# Patient Record
Sex: MALE | Race: WHITE | Employment: FULL TIME | URBAN - METROPOLITAN AREA
[De-identification: names, ages, dates, MRNs, and addresses within clinical notes are randomized per-mention and may not be internally consistent; named-entity substitution may affect disease eponyms.]

---

## 2017-02-22 PROBLEM — I10 ESSENTIAL HYPERTENSION: Status: ACTIVE | Noted: 2017-02-22

## 2017-02-22 RX ORDER — ZOLPIDEM TARTRATE 10 MG/1
TABLET ORAL
Qty: 90 TABLET | Refills: 0 | Status: SHIPPED
Start: 2017-02-22 | End: 2017-03-22

## 2017-02-22 NOTE — TELEPHONE ENCOUNTER
Future Appointments  Date Time Provider Yvonne Soni   2/24/2017 9:00 AM Meri Toney MD [facility] [place] [facility] [place]     Last Appointment: 12/02/2016, 1:30 PM, Jadiel Aviles, X-Ray  Return to clinic in 6 months  Suggested date of next visit: 03/10/20

## 2017-02-24 ENCOUNTER — OFFICE VISIT (OUTPATIENT)
Dept: FAMILY MEDICINE CLINIC | Facility: CLINIC | Age: 52
End: 2017-02-24

## 2017-02-24 VITALS
HEART RATE: 78 BPM | WEIGHT: 231 LBS | DIASTOLIC BLOOD PRESSURE: 72 MMHG | BODY MASS INDEX: 32 KG/M2 | TEMPERATURE: 97 F | SYSTOLIC BLOOD PRESSURE: 114 MMHG

## 2017-02-24 DIAGNOSIS — Z94.0 STATUS POST KIDNEY TRANSPLANT: Primary | ICD-10-CM

## 2017-02-24 DIAGNOSIS — M54.5 CHRONIC LOW BACK PAIN, UNSPECIFIED BACK PAIN LATERALITY, WITH SCIATICA PRESENCE UNSPECIFIED: ICD-10-CM

## 2017-02-24 DIAGNOSIS — G89.29 CHRONIC LOW BACK PAIN, UNSPECIFIED BACK PAIN LATERALITY, WITH SCIATICA PRESENCE UNSPECIFIED: ICD-10-CM

## 2017-02-24 DIAGNOSIS — Z94.83 PANCREAS REPLACED BY TRANSPLANT (HCC): ICD-10-CM

## 2017-02-24 PROCEDURE — 99214 OFFICE O/P EST MOD 30 MIN: CPT | Performed by: FAMILY MEDICINE

## 2017-02-24 RX ORDER — CYCLOBENZAPRINE HCL 5 MG
5 TABLET ORAL NIGHTLY PRN
Qty: 30 TABLET | Refills: 0 | Status: SHIPPED | OUTPATIENT
Start: 2017-02-24 | End: 2017-03-16

## 2017-02-24 RX ORDER — CYCLOBENZAPRINE HCL 5 MG
5 TABLET ORAL NIGHTLY PRN
Qty: 90 TABLET | Refills: 0 | Status: SHIPPED | OUTPATIENT
Start: 2017-02-24 | End: 2017-07-15

## 2017-02-24 NOTE — PROGRESS NOTES
[facility]  PROGRESS NOTE  Chief Complaint:   Patient presents with:  Referral: to dermatologist   Medication Request: muscle relaxer       HPI:   This is a 46year old male presents complaining of chronic low back pain.   Patient has had tablet Rfl: 0   Albuterol Sulfate HFA (PROAIR HFA) 108 (90 Base) MCG/ACT Inhalation Aero Soln  Disp:  Rfl:    aspirin EC 81 MG Oral Tab EC Take 81 mg by mouth daily. Disp:  Rfl:    Atorvastatin Calcium 10 MG Oral Tab Take 10 mg by mouth daily.    Disp:  R skin lesion, or excessive skin dryness. CARDIOVASCULAR:  Denies chest pain, chest pressure, chest discomfort, palpitations, edema, dyspnea on exertion or at rest.  RESPIRATORY:  Denies shortness of breath, wheezing, cough or sputum.   GASTROINTESTINAL:  Leotha Leyden ([email or web address] 75.)  -     Derm Referral - External    Other orders  -     Discontinue: Cyclobenzaprine HCl 5 MG Oral Tab; Take 1 tablet (5 mg total) by mouth nightly as needed for Muscle spasms. -     Cyclobenzaprine HCl 5 MG Oral Tab;  Take 1 tablet (5 mg total) by mo

## 2017-02-24 NOTE — PATIENT INSTRUCTIONS
Continue current medications   Advice rest, heating pad and muscle relaxant as needed   Return to clinic if no improvement.

## 2017-03-16 RX ORDER — ATENOLOL 25 MG/1
1 TABLET ORAL 2 TIMES DAILY
COMMUNITY
End: 2017-07-15

## 2017-03-16 RX ORDER — PREDNISOLONE ACETATE 10 MG/ML
1 SUSPENSION/ DROPS OPHTHALMIC 4 TIMES DAILY
Refills: 1 | COMMUNITY
Start: 2017-03-02 | End: 2017-07-15

## 2017-03-16 RX ORDER — CYCLOBENZAPRINE HCL 5 MG
TABLET ORAL
Qty: 30 TABLET | Refills: 0 | Status: SHIPPED | OUTPATIENT
Start: 2017-03-16 | End: 2017-07-01

## 2017-03-16 RX ORDER — KETOROLAC TROMETHAMINE 5 MG/ML
1 SOLUTION OPHTHALMIC 4 TIMES DAILY
Refills: 1 | COMMUNITY
Start: 2017-03-04 | End: 2017-07-15

## 2017-03-16 RX ORDER — PREDNISONE 1 MG/1
1 TABLET ORAL DAILY
Refills: 1 | COMMUNITY
Start: 2017-02-22

## 2017-03-23 RX ORDER — ZOLPIDEM TARTRATE 10 MG/1
TABLET ORAL
Qty: 90 TABLET | Refills: 0 | Status: SHIPPED | OUTPATIENT
Start: 2017-03-23 | End: 2017-06-26

## 2017-03-29 ENCOUNTER — TELEPHONE (OUTPATIENT)
Dept: FAMILY MEDICINE CLINIC | Facility: CLINIC | Age: 52
End: 2017-03-29

## 2017-05-26 PROBLEM — I10 HYPERTENSION: Status: ACTIVE | Noted: 2017-05-26

## 2017-05-26 PROBLEM — N18.4 CHRONIC KIDNEY DISEASE, STAGE 4 (SEVERE) (HCC): Status: ACTIVE | Noted: 2017-05-26

## 2017-06-23 RX ORDER — CYCLOBENZAPRINE HCL 5 MG
TABLET ORAL
Qty: 90 TABLET | Refills: 0 | OUTPATIENT
Start: 2017-06-23

## 2017-06-23 NOTE — TELEPHONE ENCOUNTER
Patient set up appt with Essence due  being out next week.     Future Appointments  Date Time Provider Yvonne Soni   6/30/2017 8:30 AM [facility]

## 2017-06-23 NOTE — TELEPHONE ENCOUNTER
Future Appointments  Date Time Provider Yvonne Soni   6/30/2017 8:30 AM ALEX Asencio [facility] [place] [facility] [place]       Return in about 3 months (around 5/24/2017). Left message to contact office.

## 2017-06-26 RX ORDER — PANTOPRAZOLE SODIUM 40 MG/1
40 TABLET, DELAYED RELEASE ORAL DAILY
Qty: 90 TABLET | Refills: 0 | Status: SHIPPED | OUTPATIENT
Start: 2017-06-26 | End: 2017-09-11

## 2017-06-26 RX ORDER — ZOLPIDEM TARTRATE 10 MG/1
10 TABLET ORAL NIGHTLY PRN
Qty: 90 TABLET | Refills: 0 | Status: SHIPPED | OUTPATIENT
Start: 2017-06-26 | End: 2017-06-29

## 2017-06-26 RX ORDER — ATORVASTATIN CALCIUM 10 MG/1
10 TABLET, FILM COATED ORAL DAILY
Qty: 90 TABLET | Refills: 0 | Status: SHIPPED | OUTPATIENT
Start: 2017-06-26 | End: 2017-09-11

## 2017-06-26 NOTE — TELEPHONE ENCOUNTER
Future Appointments  Date Time Provider Yvonne Nae   6/30/2017 8:30 AM ALEX Magdaleno [facility] [place] [facility] [place]     Return in about 3 months (around 5/24/2017).

## 2017-06-27 ENCOUNTER — TELEPHONE (OUTPATIENT)
Dept: FAMILY MEDICINE CLINIC | Facility: CLINIC | Age: 52
End: 2017-06-27

## 2017-06-29 RX ORDER — ZOLPIDEM TARTRATE 10 MG/1
10 TABLET ORAL NIGHTLY PRN
Qty: 90 TABLET | Refills: 0 | Status: SHIPPED
Start: 2017-06-29 | End: 2017-07-15

## 2017-06-29 NOTE — TELEPHONE ENCOUNTER
Dr. Prudence Meyer ordered this on 6/26/17 for pt and it was faxed to pharmacy x 2, received that pharmacy not able to take this script due to unknown issues, needs new script issued. Will issue new script to be faxed over.  More than likely because fax was placed

## 2017-06-30 ENCOUNTER — TELEPHONE (OUTPATIENT)
Dept: FAMILY MEDICINE CLINIC | Facility: CLINIC | Age: 52
End: 2017-06-30

## 2017-07-01 ENCOUNTER — OFFICE VISIT (OUTPATIENT)
Dept: FAMILY MEDICINE CLINIC | Facility: CLINIC | Age: 52
End: 2017-07-01

## 2017-07-01 VITALS
WEIGHT: 235 LBS | BODY MASS INDEX: 31.83 KG/M2 | HEART RATE: 64 BPM | HEIGHT: 72 IN | DIASTOLIC BLOOD PRESSURE: 78 MMHG | SYSTOLIC BLOOD PRESSURE: 112 MMHG | RESPIRATION RATE: 16 BRPM | TEMPERATURE: 98 F

## 2017-07-01 DIAGNOSIS — Z76.0 ENCOUNTER FOR MEDICATION REFILL: Primary | ICD-10-CM

## 2017-07-01 RX ORDER — MYCOPHENOLIC ACID 360 MG/1
360 TABLET, DELAYED RELEASE ORAL
COMMUNITY
Start: 2017-06-22 | End: 2017-07-01

## 2017-07-01 RX ORDER — NIFEDIPINE 30 MG/1
TABLET, FILM COATED, EXTENDED RELEASE ORAL
Refills: 1 | COMMUNITY
Start: 2017-05-03 | End: 2017-07-01

## 2017-07-01 RX ORDER — AMOXICILLIN 250 MG
1 CAPSULE ORAL DAILY
COMMUNITY
Start: 2016-08-09 | End: 2019-06-15 | Stop reason: ALTCHOICE

## 2017-07-01 RX ORDER — NICOTINE POLACRILEX 4 MG/1
GUM, CHEWING ORAL
COMMUNITY
End: 2017-11-02

## 2017-07-01 RX ORDER — SIMVASTATIN 20 MG
20 TABLET ORAL
COMMUNITY
End: 2017-07-01

## 2017-07-01 NOTE — PROGRESS NOTES
[address] [place] [address]  PROGRESS NOTE  Niki Santiago is a 45year old male. Chief Complaint:  Patient presents with:  Medication Follow-Up    HPI:   Patient presents to office visit for refill on Ambien for insomnia.  Patient states that he mad Rfl:    Zolpidem Tartrate 10 MG Oral Tab Take 1 tablet (10 mg total) by mouth nightly as needed for Sleep. Disp: 90 tablet Rfl: 0   atorvastatin 10 MG Oral Tab Take 1 tablet (10 mg total) by mouth daily.  Disp: 90 tablet Rfl: 0   Pantoprazole Sodium (PROTON by mouth daily. Disp:  Rfl:    Sulfamethoxazole-TMP DS (BACTRIM DS) 800-160 MG Oral Tab per tablet Take 1 tablet by mouth once. Disp:  Rfl:    tacrolimus 1 MG Oral Cap Take 1 mg by mouth.  Takes three tablets in the morning and three tablets at night  D prescription monitoring program database, patient had Ambien last refilled for 4/24/17. Refill too soon by 3 weeks. Explained to patient that I am unable to prescribe Norco.  Patient needs to follow-up with PCP in 2 weeks for refills.   Patient had no oth

## 2017-07-01 NOTE — PATIENT INSTRUCTIONS
Refill too soon for Ambien. Unable to refill Norco at this time need to follow-up with PCP in 2 weeks to address refills. Will not charge for this visit.

## 2017-07-15 ENCOUNTER — OFFICE VISIT (OUTPATIENT)
Dept: FAMILY MEDICINE CLINIC | Facility: CLINIC | Age: 52
End: 2017-07-15

## 2017-07-15 VITALS
DIASTOLIC BLOOD PRESSURE: 68 MMHG | SYSTOLIC BLOOD PRESSURE: 148 MMHG | HEIGHT: 72 IN | TEMPERATURE: 98 F | RESPIRATION RATE: 18 BRPM | HEART RATE: 72 BPM | BODY MASS INDEX: 32.44 KG/M2 | WEIGHT: 239.5 LBS

## 2017-07-15 DIAGNOSIS — M54.41 ACUTE RIGHT-SIDED LOW BACK PAIN WITH RIGHT-SIDED SCIATICA: Primary | ICD-10-CM

## 2017-07-15 DIAGNOSIS — G47.00 INSOMNIA, UNSPECIFIED TYPE: ICD-10-CM

## 2017-07-15 PROBLEM — N18.4 CHRONIC KIDNEY DISEASE, STAGE 4 (SEVERE) (HCC): Status: RESOLVED | Noted: 2017-05-26 | Resolved: 2017-07-15

## 2017-07-15 PROCEDURE — 99214 OFFICE O/P EST MOD 30 MIN: CPT | Performed by: FAMILY MEDICINE

## 2017-07-15 RX ORDER — DIAZEPAM 5 MG/1
1 TABLET ORAL AS NEEDED
Refills: 0 | COMMUNITY
Start: 2017-07-06 | End: 2017-07-15

## 2017-07-15 RX ORDER — HYDROCODONE BITARTRATE AND ACETAMINOPHEN 10; 325 MG/1; MG/1
1 TABLET ORAL EVERY 6 HOURS PRN
Qty: 30 TABLET | Refills: 0 | Status: SHIPPED | OUTPATIENT
Start: 2017-07-15 | End: 2017-11-17 | Stop reason: ALTCHOICE

## 2017-07-15 RX ORDER — CYCLOBENZAPRINE HCL 10 MG
1 TABLET ORAL AS NEEDED
Refills: 0 | COMMUNITY
Start: 2017-07-04 | End: 2017-07-15

## 2017-07-15 RX ORDER — HYDROCODONE BITARTRATE AND ACETAMINOPHEN 10; 325 MG/1; MG/1
1 TABLET ORAL AS NEEDED
Refills: 0 | COMMUNITY
Start: 2017-07-06 | End: 2017-07-15

## 2017-07-15 RX ORDER — DIAZEPAM 5 MG/1
5 TABLET ORAL EVERY 8 HOURS PRN
Qty: 30 TABLET | Refills: 0 | Status: SHIPPED | OUTPATIENT
Start: 2017-07-15 | End: 2017-12-01

## 2017-07-15 RX ORDER — ZOLPIDEM TARTRATE 10 MG/1
10 TABLET ORAL NIGHTLY PRN
Qty: 90 TABLET | Refills: 0 | Status: SHIPPED | OUTPATIENT
Start: 2017-07-15 | End: 2017-10-11

## 2017-07-15 NOTE — PATIENT INSTRUCTIONS
Continue current medications   Do not take diazepam and ambien at same time, it will make you very drowsy. Advice rest, heating pad, as needed  Keep appointment with orthopedics. Return to clinic in 1-2 weeks if no improvement.  Sooner if symptoms gets

## 2017-09-11 RX ORDER — ATORVASTATIN CALCIUM 10 MG/1
TABLET, FILM COATED ORAL
Qty: 90 TABLET | Refills: 0 | Status: SHIPPED | OUTPATIENT
Start: 2017-09-11 | End: 2017-11-01

## 2017-09-11 RX ORDER — PANTOPRAZOLE SODIUM 40 MG/1
TABLET, DELAYED RELEASE ORAL
Qty: 90 TABLET | Refills: 0 | Status: SHIPPED | OUTPATIENT
Start: 2017-09-11 | End: 2017-11-02

## 2017-09-11 NOTE — TELEPHONE ENCOUNTER
Future Appointments  Date Time Provider Yvonne Nae   11/17/2017 8:00 AM Javy Mckeon MD [facility] [place] [facility] [place]     LOV: 7/15/17

## 2017-10-11 RX ORDER — ZOLPIDEM TARTRATE 10 MG/1
10 TABLET ORAL NIGHTLY PRN
Qty: 90 TABLET | Refills: 0 | Status: SHIPPED | OUTPATIENT
Start: 2017-10-11 | End: 2018-01-13

## 2017-10-11 NOTE — TELEPHONE ENCOUNTER
Future Appointments  Date Time Provider Yvonne Soni   11/17/2017 8:00 AM Ney Urias MD [facility] [place] [facility]

## 2017-11-01 RX ORDER — ATORVASTATIN CALCIUM 10 MG/1
10 TABLET, FILM COATED ORAL NIGHTLY
Qty: 90 TABLET | Refills: 1 | Status: SHIPPED | OUTPATIENT
Start: 2017-11-01 | End: 2018-05-09

## 2017-11-01 NOTE — TELEPHONE ENCOUNTER
Future appt:     Your appointments     Date & Time Appointment Department [facility])    Nov 17, 2017  8:00 AM CST Follow up - Extended with Emily Pearson, [address] [place], [place] [address]way, Nick ([place])        [address] [place] [address]

## 2017-11-02 RX ORDER — PANTOPRAZOLE SODIUM 40 MG/1
40 TABLET, DELAYED RELEASE ORAL DAILY
Qty: 90 TABLET | Refills: 0 | Status: SHIPPED | OUTPATIENT
Start: 2017-11-02 | End: 2020-06-20 | Stop reason: ALTCHOICE

## 2017-11-02 NOTE — TELEPHONE ENCOUNTER
Future Appointments  Date Time Provider Yvonne Nae   11/17/2017 8:00 AM Luis Mensah MD [facility] [place] [facility] [place]     Patient's pharmacy switched due to insurance so they are requesting the refill.

## 2017-11-03 ENCOUNTER — TELEPHONE (OUTPATIENT)
Dept: FAMILY MEDICINE CLINIC | Facility: CLINIC | Age: 52
End: 2017-11-03

## 2017-11-03 NOTE — TELEPHONE ENCOUNTER
Received fax in regards to patient's metoprolol, this will no longer be filled by [facility] in [place] and recommending PCP prescribe this.   This is on patient's medication list.  Please call patient ask if he needs ref

## 2017-11-06 NOTE — TELEPHONE ENCOUNTER
Spoke with patient notified him of the below information. Patient states he does not need a refill of his Metoprolol at this time.

## 2017-11-17 ENCOUNTER — OFFICE VISIT (OUTPATIENT)
Dept: FAMILY MEDICINE CLINIC | Facility: CLINIC | Age: 52
End: 2017-11-17

## 2017-11-17 VITALS
RESPIRATION RATE: 16 BRPM | DIASTOLIC BLOOD PRESSURE: 58 MMHG | HEIGHT: 71.75 IN | SYSTOLIC BLOOD PRESSURE: 142 MMHG | WEIGHT: 237.19 LBS | HEART RATE: 80 BPM | TEMPERATURE: 98 F | BODY MASS INDEX: 32.48 KG/M2

## 2017-11-17 DIAGNOSIS — Z94.0 STATUS POST KIDNEY TRANSPLANT: ICD-10-CM

## 2017-11-17 DIAGNOSIS — Z94.83 PANCREAS REPLACED BY TRANSPLANT (HCC): ICD-10-CM

## 2017-11-17 DIAGNOSIS — G47.00 INSOMNIA, UNSPECIFIED TYPE: ICD-10-CM

## 2017-11-17 DIAGNOSIS — Z23 NEEDS FLU SHOT: ICD-10-CM

## 2017-11-17 DIAGNOSIS — I10 ESSENTIAL HYPERTENSION: Primary | ICD-10-CM

## 2017-11-17 PROCEDURE — 99214 OFFICE O/P EST MOD 30 MIN: CPT | Performed by: FAMILY MEDICINE

## 2017-11-17 PROCEDURE — G0008 ADMIN INFLUENZA VIRUS VAC: HCPCS | Performed by: FAMILY MEDICINE

## 2017-11-17 PROCEDURE — 90686 IIV4 VACC NO PRSV 0.5 ML IM: CPT | Performed by: FAMILY MEDICINE

## 2017-11-17 RX ORDER — METOPROLOL TARTRATE 50 MG/1
50 TABLET, FILM COATED ORAL 2 TIMES DAILY
Qty: 180 TABLET | Refills: 1 | Status: SHIPPED | OUTPATIENT
Start: 2017-11-17 | End: 2018-07-27

## 2017-11-17 NOTE — PATIENT INSTRUCTIONS
Continue current medications   Cut back on coffee. Recommend healthy diet, exercise and weight loss. See Dr Agsutín Lemus for sleep evaluation.

## 2017-11-17 NOTE — PROGRESS NOTES
[address]  PROGRESS NOTE  Chief Complaint:   Patient presents with:   Follow - Up: med check, trasplant  Imm/Inj: poss flu shot      HPI:   This is a 45year old male with hx of kidney and pancreas transplant, presents for medication ref Take 1 tablet (50 mg total) by mouth 2 (two) times daily. Disp: 180 tablet Rfl: 1   Pantoprazole Sodium 40 MG Oral Tab EC Take 1 tablet (40 mg total) by mouth daily.  Disp: 90 tablet Rfl: 0   atorvastatin 10 MG Oral Tab Take 1 tablet (10 mg total) by mouth pain, visual loss, blurred vision, double vision or yellow sclerae. HEENT:  Denies hearing loss, sneezing, congestion, runny nose or sore throat. INTEGUMENTARY:  Denies rashes, itching, skin lesion, or excessive skin dryness.   CARDIOVASCULAR:  Denies ch rales/rhonchi/wheezing. HEART:  Regular rate and rhythm, S1 and S2 are normal, no murmurs, rubs or gallops. EXTREMITIES: No edema, no cyanosis, no clubbing, FROM, 2+ dorsalis pedis pulses bilaterally.   ABDOMEN: Soft, nondistended, nontender, bowel sounds today.     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

## 2017-12-01 ENCOUNTER — OFFICE VISIT (OUTPATIENT)
Dept: FAMILY MEDICINE CLINIC | Facility: CLINIC | Age: 52
End: 2017-12-01

## 2017-12-01 VITALS
WEIGHT: 233.63 LBS | RESPIRATION RATE: 16 BRPM | HEIGHT: 71.5 IN | SYSTOLIC BLOOD PRESSURE: 120 MMHG | DIASTOLIC BLOOD PRESSURE: 58 MMHG | HEART RATE: 78 BPM | TEMPERATURE: 97 F | BODY MASS INDEX: 31.99 KG/M2

## 2017-12-01 DIAGNOSIS — E86.0 DEHYDRATION: Primary | ICD-10-CM

## 2017-12-01 PROCEDURE — 99213 OFFICE O/P EST LOW 20 MIN: CPT | Performed by: FAMILY MEDICINE

## 2017-12-04 ENCOUNTER — TELEPHONE (OUTPATIENT)
Dept: FAMILY MEDICINE CLINIC | Facility: CLINIC | Age: 52
End: 2017-12-04

## 2017-12-05 ENCOUNTER — TELEPHONE (OUTPATIENT)
Dept: FAMILY MEDICINE CLINIC | Facility: CLINIC | Age: 52
End: 2017-12-05

## 2017-12-05 NOTE — TELEPHONE ENCOUNTER
Received labs from [facility] that was ordered by Dr. Mandy Bledsoe. Called 's office per [facility] to verify that Magnesium level was being addressed. Spoke with nurse states Norma Valverde will address all labs.

## 2017-12-22 ENCOUNTER — OFFICE VISIT (OUTPATIENT)
Dept: FAMILY MEDICINE CLINIC | Facility: CLINIC | Age: 52
End: 2017-12-22

## 2017-12-22 VITALS
SYSTOLIC BLOOD PRESSURE: 142 MMHG | WEIGHT: 230 LBS | BODY MASS INDEX: 32 KG/M2 | HEART RATE: 76 BPM | TEMPERATURE: 97 F | DIASTOLIC BLOOD PRESSURE: 72 MMHG | RESPIRATION RATE: 16 BRPM

## 2017-12-22 DIAGNOSIS — G25.81 RLS (RESTLESS LEGS SYNDROME): ICD-10-CM

## 2017-12-22 DIAGNOSIS — G47.61 PLMD (PERIODIC LIMB MOVEMENT DISORDER): ICD-10-CM

## 2017-12-22 DIAGNOSIS — R06.83 SNORING: ICD-10-CM

## 2017-12-22 DIAGNOSIS — G47.9 SLEEP DISTURBANCE: Primary | ICD-10-CM

## 2017-12-22 PROCEDURE — 99214 OFFICE O/P EST MOD 30 MIN: CPT | Performed by: NURSE PRACTITIONER

## 2017-12-22 RX ORDER — MELATONIN
1000 DAILY
COMMUNITY
Start: 2017-12-13 | End: 2019-03-15

## 2017-12-22 RX ORDER — MELATONIN
500 2 TIMES DAILY
COMMUNITY

## 2017-12-22 NOTE — PROGRESS NOTES
[facility]  SLEEP PROGR[facility] NOTE        HPI:   This is a 45year old male coming in for Patient presents with:  Consult: Sleep      HPI: Present for a sleep consult. States that he only gets 4 hours of sleep a night.  Is on prednisone for Age of Onset   • Cancer Mother    • Cancer Sister    • Hypertension Brother      Allergies:    Amlodipine                  Comment:Other reaction(s): ankle swelling  Ace Inhibitors          Coughing    Comment:cough  Current Meds:    Current Outpatient Pre tacrolimus 1 MG Oral Cap Take 1 mg by mouth.  Takes three tablets in the morning and three tablets at night  Disp:  Rfl:    ARTIFICIAL TEARS 0.1-0.3 % Ophthalmic Solution None Entered Disp:  Rfl:       Counseling given: Not Answered         Problem List: normal.   Neck: Normal range of motion. Neck supple. No thyromegaly present. Cardiovascular: Normal rate and regular rhythm. Exam reveals no friction rub. No murmur heard.   Pulmonary/Chest: Effort normal and breath sounds normal. No respiratory distr equiptment on a  regular schedule. Filters and seals shall be changed every 1 month,  Hoses every 3 months,   CPAP mask and humidifier  chamber changed every 6 month  with the Durable medical equipment provider.          Meds & Refills for this Visit:    N

## 2017-12-22 NOTE — PATIENT INSTRUCTIONS
Order for sleep study will be sent to [facility]. They will call to set up an appointment in the next week. If not heard from them, please call them at [phone]. If any problems, please call us at [phone].       Warned if still with s

## 2017-12-26 RX ORDER — DEXAMETHASONE 4 MG/1
TABLET ORAL
Qty: 12 G | Refills: 0 | Status: SHIPPED | OUTPATIENT
Start: 2017-12-26 | End: 2018-04-28

## 2017-12-26 NOTE — TELEPHONE ENCOUNTER
Future appt:     Your appointments     Date & Time Appointment Department [facility])    Feb 17, 2018  8:00 AM CST Follow up with Rafael Gardner [address] [place], [place] [address]way, Nick ([facility])        [address] [facility]

## 2018-01-13 RX ORDER — ZOLPIDEM TARTRATE 10 MG/1
TABLET ORAL
Qty: 90 TABLET | Refills: 0 | Status: SHIPPED | OUTPATIENT
Start: 2018-01-13 | End: 2018-03-30

## 2018-01-13 NOTE — TELEPHONE ENCOUNTER
Future Appointments  Date Time Provider [facility] Nae   1/19/2018 2:00 PM ALEX Bowling [facility] [place] [facility] [place]   2/17/2018 8:00 AM Mela Jung MD [facility] [place] [facility] [place]

## 2018-01-19 ENCOUNTER — OFFICE VISIT (OUTPATIENT)
Dept: FAMILY MEDICINE CLINIC | Facility: CLINIC | Age: 53
End: 2018-01-19

## 2018-01-19 VITALS
HEART RATE: 88 BPM | RESPIRATION RATE: 16 BRPM | TEMPERATURE: 98 F | SYSTOLIC BLOOD PRESSURE: 138 MMHG | DIASTOLIC BLOOD PRESSURE: 70 MMHG | BODY MASS INDEX: 33 KG/M2 | WEIGHT: 236.38 LBS

## 2018-01-19 DIAGNOSIS — G47.33 OSA (OBSTRUCTIVE SLEEP APNEA): Primary | ICD-10-CM

## 2018-01-19 DIAGNOSIS — G47.34 NOCTURNAL HYPOXEMIA: ICD-10-CM

## 2018-01-19 PROCEDURE — 94660 CPAP INITIATION&MGMT: CPT | Performed by: NURSE PRACTITIONER

## 2018-01-19 NOTE — PATIENT INSTRUCTIONS
Warned if still with sleep apnea and not using CPAP has 7 fold increased risk and heart attack, stroke, abnormal heart rhythm, and death. Increased risk of driving accidents. Advised to refrain from driving when sleepy.      Order for sleep study will be

## 2018-01-19 NOTE — PROGRESS NOTES
[facility]  [facility]LE[facility]GRESS [facility]        HPI:   This is a 45year old male coming in for Patient presents with: Follow - Up: SLeep study done at [facility] [place] on 1/5/18      HPI: Present to review sleep study.  Informed of the results of NAKUL and hy Age of Onset   • Cancer Mother    • Cancer Sister    • Hypertension Brother      Allergies:    Amlodipine                  Comment:Other reaction(s): ankle swelling  Ace Inhibitors          Coughing    Comment:cough  Current Meds:    Current Outpatient Pre mg by mouth every 8 (eight) hours as needed. Disp:  Rfl:    Polyethylene Glycol 3350 Oral Powder  Disp:  Rfl:    tacrolimus 1 MG Oral Cap Take 1 mg by mouth.  Takes three tablets in the morning and three tablets at night  Disp:  Rfl:    ARTIFICIAL TEARS 0 and vitals reviewed. ASSESSMENT AND PLAN:   1. NAKUL (obstructive sleep apnea)    - SLEEP MEDICINE - EXTERNAL    2.  Nocturnal hypoxemia    - SLEEP MEDICINE - EXTERNAL    Patient Instructions   Warned if still with sleep apnea and not using CPAP has 7 fo

## 2018-01-25 RX ORDER — DIAZEPAM 5 MG/1
TABLET ORAL
Qty: 30 TABLET | Refills: 0 | Status: SHIPPED | OUTPATIENT
Start: 2018-01-25 | End: 2018-06-15

## 2018-01-25 NOTE — TELEPHONE ENCOUNTER
This is not on patient's medication list nor do I see it on patient's medication list in [facility]. It looks like it was removed from patient's chart on 7/15/2017. Please advise. Future appt:     Your appointments     Date & Time Appointment Depart

## 2018-02-02 ENCOUNTER — TELEPHONE (OUTPATIENT)
Dept: FAMILY MEDICINE CLINIC | Facility: CLINIC | Age: 53
End: 2018-02-02

## 2018-02-02 DIAGNOSIS — G47.33 OSA (OBSTRUCTIVE SLEEP APNEA): Primary | ICD-10-CM

## 2018-02-02 NOTE — TELEPHONE ENCOUNTER
Patient had cpap titration study done at [facility] on 1/29/18  Dr. Jaspal Knuz read sleep study  Per Dr. Jaspal Kunz patient to start on auto cpap 11-16  Patient notified of results and recommendations and expressed understanding.   Script and all related documents f

## 2018-02-17 ENCOUNTER — OFFICE VISIT (OUTPATIENT)
Dept: FAMILY MEDICINE CLINIC | Facility: CLINIC | Age: 53
End: 2018-02-17

## 2018-02-17 VITALS
DIASTOLIC BLOOD PRESSURE: 60 MMHG | WEIGHT: 243.13 LBS | HEIGHT: 71.5 IN | TEMPERATURE: 97 F | HEART RATE: 80 BPM | SYSTOLIC BLOOD PRESSURE: 140 MMHG | BODY MASS INDEX: 33.29 KG/M2 | RESPIRATION RATE: 16 BRPM

## 2018-02-17 DIAGNOSIS — Z94.0 STATUS POST KIDNEY TRANSPLANT: ICD-10-CM

## 2018-02-17 DIAGNOSIS — Z94.83 PANCREAS REPLACED BY TRANSPLANT (HCC): ICD-10-CM

## 2018-02-17 DIAGNOSIS — I10 ESSENTIAL HYPERTENSION: Primary | ICD-10-CM

## 2018-02-17 DIAGNOSIS — G47.00 INSOMNIA, UNSPECIFIED TYPE: ICD-10-CM

## 2018-02-17 DIAGNOSIS — E78.00 PURE HYPERCHOLESTEROLEMIA: ICD-10-CM

## 2018-02-17 PROCEDURE — 99214 OFFICE O/P EST MOD 30 MIN: CPT | Performed by: FAMILY MEDICINE

## 2018-02-17 NOTE — PATIENT INSTRUCTIONS
Continue current medications   Blood pressure stable today. Advice low salt diet and exercise. Monitor your blood pressure. Return to clinic if systolic blood pressure more than 060 or diastolic more than 181.    Recommend healthy diet, exercise and weigh

## 2018-02-17 NOTE — PROGRESS NOTES
[address]  PROGRESS NOTE  Chief Complaint:   Patient presents with: Follow - Up      HPI:   This is a 45year old male who is status post kidney and pancreas transplant presents for follow-up.   Patient has been doing well and has been Single, works at [employer] History:  Family History   Problem Relation Age of Onset   • Cancer Mother    • Cancer Sister    • Hypertension Brother      Allergies:    Amlodipine                  Comment:Other reaction(s): ankle swelling times daily before meals. Disp:  Rfl:    NIFEdipine ER Osmotic Release (NIFEDICAL XL) 30 MG Oral Tablet 24 Hr Take 30 mg by mouth daily. Disp:  Rfl:    Ondansetron HCl (ZOFRAN) 4 mg tablet Take 4 mg by mouth every 8 (eight) hours as needed.    Disp:  Rf of this encounter: 71.5\". Weight as of this encounter: 243 lb 2 oz. Vital signs reviewed. Physical Exam:  GEN:  Patient is alert, awake and oriented, well developed, well nourished, no acute distress.   EYES:  Sclera anicteric, conjunctiva normal, PE 03/16/2013    Patient/Caregiver Education: Patient/Caregiver Education: There are no barriers to learning. Medical education done. Outcome: Patient verbalizes understanding.  Patient is notified to call with any questions, complications, allergies, or wor

## 2018-02-23 ENCOUNTER — OFFICE VISIT (OUTPATIENT)
Dept: FAMILY MEDICINE CLINIC | Facility: CLINIC | Age: 53
End: 2018-02-23

## 2018-02-23 VITALS
HEIGHT: 71.5 IN | SYSTOLIC BLOOD PRESSURE: 126 MMHG | BODY MASS INDEX: 33.03 KG/M2 | RESPIRATION RATE: 14 BRPM | WEIGHT: 241.19 LBS | DIASTOLIC BLOOD PRESSURE: 62 MMHG | TEMPERATURE: 99 F | HEART RATE: 80 BPM

## 2018-02-23 DIAGNOSIS — G25.81 RLS (RESTLESS LEGS SYNDROME): ICD-10-CM

## 2018-02-23 DIAGNOSIS — G47.61 PLMD (PERIODIC LIMB MOVEMENT DISORDER): ICD-10-CM

## 2018-02-23 DIAGNOSIS — G47.33 OSA (OBSTRUCTIVE SLEEP APNEA): Primary | ICD-10-CM

## 2018-02-23 PROCEDURE — 99213 OFFICE O/P EST LOW 20 MIN: CPT | Performed by: NURSE PRACTITIONER

## 2018-02-23 NOTE — PATIENT INSTRUCTIONS
Increase the maximum pressure to 18. If patient has problems with that to call on Monday. Continue using CPAP. Recheck in 1 month, sooner if problems.     Warned if still with sleep apnea and not using CPAP has 7 fold increased risk and heart attack, str

## 2018-02-23 NOTE — PROGRESS NOTES
[facility]  [facility]        HPI:   This is a 45year old male coming in for Patient presents with:  Obstructive Sleep Apnea (NAKUL)      HPI:   Patient is present to review CPAP compliance. He started using CPAP 2 weeks ago.   IDANIA testicle removal,                left trigger finger injection, kidney and                pancreas transplant 2016  8/4/09: SUBCONJUNCTIVAL INJECTN      Comment: Performed by Clare Estrada at [address]  2016: [address] [facility],[address] [place] [address] MG Oral Tab EC Take 1 tablet (40 mg total) by mouth daily. Disp: 90 tablet Rfl: 0   atorvastatin 10 MG Oral Tab Take 1 tablet (10 mg total) by mouth nightly. Disp: 90 tablet Rfl: 1   Sennosides-Docusate Sodium 8.6-50 MG Oral Tab Take by mouth.  Disp:  Rfl: Neurological: Negative. Psychiatric/Behavioral: Negative.         EXAM:   /62 (BP Location: Right arm, Patient Position: Sitting, Cuff Size: large)   Pulse 80   Temp 98.6 °F (37 °C) (Tympanic)   Resp 14   Ht 71.5\"   Wt 241 lb 3.2 oz   BMI 33.17 Instructions   Increase the maximum pressure to 18. If patient has problems with that to call on Monday. Continue using CPAP. Recheck in 1 month, sooner if problems.     Warned if still with sleep apnea and not using CPAP has 7 fold increased risk and he

## 2018-03-30 ENCOUNTER — OFFICE VISIT (OUTPATIENT)
Dept: FAMILY MEDICINE CLINIC | Facility: CLINIC | Age: 53
End: 2018-03-30

## 2018-03-30 VITALS
TEMPERATURE: 96 F | WEIGHT: 242.13 LBS | RESPIRATION RATE: 16 BRPM | DIASTOLIC BLOOD PRESSURE: 62 MMHG | HEART RATE: 88 BPM | BODY MASS INDEX: 33.16 KG/M2 | SYSTOLIC BLOOD PRESSURE: 162 MMHG | HEIGHT: 71.5 IN

## 2018-03-30 DIAGNOSIS — N18.6 END STAGE RENAL DISEASE (HCC): ICD-10-CM

## 2018-03-30 DIAGNOSIS — G25.81 RLS (RESTLESS LEGS SYNDROME): ICD-10-CM

## 2018-03-30 DIAGNOSIS — G47.33 OSA (OBSTRUCTIVE SLEEP APNEA): Primary | ICD-10-CM

## 2018-03-30 PROCEDURE — 99214 OFFICE O/P EST MOD 30 MIN: CPT | Performed by: FAMILY MEDICINE

## 2018-03-30 RX ORDER — CALCITRIOL 0.25 UG/1
1 CAPSULE, LIQUID FILLED ORAL EVERY OTHER DAY
Refills: 3 | COMMUNITY
Start: 2018-03-26 | End: 2019-03-15

## 2018-03-30 RX ORDER — HYDROCODONE BITARTRATE AND ACETAMINOPHEN 5; 325 MG/1; MG/1
1 TABLET ORAL EVERY 4 HOURS PRN
Refills: 0 | COMMUNITY
Start: 2018-03-04

## 2018-03-30 RX ORDER — LORATADINE 10 MG/1
10 TABLET ORAL DAILY
COMMUNITY
End: 2019-10-18 | Stop reason: ALTCHOICE

## 2018-03-30 RX ORDER — ZOLPIDEM TARTRATE 12.5 MG/1
12.5 TABLET, FILM COATED, EXTENDED RELEASE ORAL NIGHTLY PRN
Qty: 30 TABLET | Refills: 6 | Status: SHIPPED | OUTPATIENT
Start: 2018-03-30 | End: 2019-01-18 | Stop reason: DRUGHIGH

## 2018-03-30 RX ORDER — ACETAMINOPHEN 325 MG/1
325 TABLET ORAL EVERY 6 HOURS PRN
COMMUNITY

## 2018-03-30 NOTE — PATIENT INSTRUCTIONS
1. Increase pressure today basal rate. 2. Saline nasal spray in the am.   3. flonase 1 spray in each nostril before bed. 4. Change to extended release ambien. 5. How to avoid insomnia  1. Wake up at the same time each day. Maintaining a regular sleep computer or smart phone, watch TV, catch up on work or listen to music while in bed. All these activities can increase alertness and make it difficult to fall asleep. 7. Do not eat or drink right before going to bed. Eating a late dinner or snacking before

## 2018-03-30 NOTE — PROGRESS NOTES
[facility]  [facility]        HPI:   This is a 52year old male coming in for Patient presents with:  Obstructive Sleep Apnea (NAKUL)  Follow - Up      HPI: Patient states overall he is doing well he has no new concerns he is tole HYPERTENSION      Past Surgical History:  No date: OTHER      Comment: Left shoulder surgery, left testicle removal,                left trigger finger injection, kidney and                pancreas transplant 2016  8/4/09: [address] FLOVENT  MCG/ACT Inhalation Aerosol INHALE 1 PUFF TWICE DAILY Disp: 12 g Rfl: 0   Magnesium Oxide 400 (240 Mg) MG Oral Tab Take 400 mg by mouth 2 (two) times daily. Disp:  Rfl:    Vitamin D3 1000 units Oral Tab Take 1,000 Units by mouth daily.  Dis kidney transplant     Organ or tissue replaced by transplant     Hypertension     NAKUL (obstructive sleep apnea)     PLMD (periodic limb movement disorder)     RLS (restless legs syndrome)      REVIEW OF SYSTEMS:   Review of Systems   Constitutional: Alycia Barreto He has no cervical adenopathy. Neurological: He is alert and oriented to person, place, and time. He has normal reflexes. Skin: Skin is warm and dry. Psychiatric: He has a normal mood and affect.  His behavior is normal. Judgment and thought content n The bright lights from modern digital clocks have been associated with poor sleep. Clocks should be turned away from you and if the light is too bright, they should be covered. 4. Eliminate naps. While napping seems like a proper way to catch up on miss after dinner -- to review the day and to make plans for the next day. The goal is to avoid doing these things while trying to fall asleep. It is also useful to make a list of, say, work-related tasks for the next day before leaving work.  That, at least, el

## 2018-03-30 NOTE — PROGRESS NOTES
[facility]  PROGRESS NOTE        HPI:   This is a 52year old male coming in for Patient presents with:  Obstructive Sleep Apnea (NAKUL)  Follow - Up    HPI      Results for orders placed or performed in visit on 12/04/17  -HEMOGLOBIN A1C loratadine 10 MG Oral Tab Take 10 mg by mouth daily. Disp:  Rfl:    acetaminophen 325 MG Oral Tab Take 325 mg by mouth every 6 (six) hours as needed for Pain.  Disp:  Rfl:    Zolpidem Tartrate ER (AMBIEN CR) 12.5 MG Oral Tab CR Take 1 tablet (12.5 mg tota Disp:  Rfl:    Polyethylene Glycol 3350 Oral Powder as needed. Disp:  Rfl:    tacrolimus 1 MG Oral Cap Take 1 mg by mouth.  Takes three tablets in the morning and three tablets at night  Disp:  Rfl:    ARTIFICIAL TEARS 0.1-0.3 % Ophthalmic Solution None [id number] [facility] [id number] [phone]        No Follow-up on file.     Meds & Refills for this Visit:  Signed Prescriptions Disp Refills    Zolpidem Tartrate ER (AMBIEN CR) 12.5 MG Oral Tab CR 30 tablet 6      Sig: Take 1 table

## 2018-04-28 RX ORDER — DEXAMETHASONE 4 MG/1
TABLET ORAL
Qty: 12 G | Refills: 0 | Status: SHIPPED | OUTPATIENT
Start: 2018-04-28 | End: 2018-08-19

## 2018-04-28 NOTE — TELEPHONE ENCOUNTER
Future appt:     Your appointments     Date & Time Appointment Department [facility])    Venkatesh 15, 2018  8:00 AM CDT Medicare Annual Well Visit with Wili Frank MD [address] [facility], [facility] ([facility])    Jul 06, 2018  2

## 2018-04-28 NOTE — TELEPHONE ENCOUNTER
Future appt:     Your appointments     Date & Time Appointment Department [facility])    Venkatesh 15, 2018  8:00 AM CDT Medicare Annual Well Visit with Zenon Garcia MD [address] [facility], [facility] ([facility])    Jul 06, 2018  2

## 2018-04-30 RX ORDER — ZOLPIDEM TARTRATE 10 MG/1
TABLET ORAL
Qty: 90 TABLET | Refills: 0 | Status: SHIPPED | OUTPATIENT
Start: 2018-04-30 | End: 2018-07-23

## 2018-05-09 RX ORDER — ATORVASTATIN CALCIUM 10 MG/1
10 TABLET, FILM COATED ORAL NIGHTLY
Qty: 90 TABLET | Refills: 1 | Status: SHIPPED | OUTPATIENT
Start: 2018-05-09 | End: 2018-10-17

## 2018-05-09 NOTE — TELEPHONE ENCOUNTER
Future Appointments  Date Time Provider [facility] [place]   6/15/2018 8:00 AM Conchita Ngo MD [facility] [place] [facility] [place]   7/6/2018 2:20 PM Verner Cones, MD [facility] [place]      Return in about 4 months (around 6/17/2018).

## 2018-06-02 NOTE — TELEPHONE ENCOUNTER
Future appt:     Your appointments     Date & Time Appointment Department [facility])    Venkatesh 15, 2018  8:00 AM CDT Medicare Annual Well Visit with Claudell Public, MD [address] [facility], [facility] ([facility])    Jul 06, 2018  2

## 2018-06-15 ENCOUNTER — OFFICE VISIT (OUTPATIENT)
Dept: FAMILY MEDICINE CLINIC | Facility: CLINIC | Age: 53
End: 2018-06-15

## 2018-06-15 VITALS
TEMPERATURE: 98 F | SYSTOLIC BLOOD PRESSURE: 144 MMHG | HEART RATE: 72 BPM | HEIGHT: 71.5 IN | WEIGHT: 243.31 LBS | DIASTOLIC BLOOD PRESSURE: 68 MMHG | BODY MASS INDEX: 33.32 KG/M2 | RESPIRATION RATE: 18 BRPM

## 2018-06-15 DIAGNOSIS — I10 ESSENTIAL HYPERTENSION: ICD-10-CM

## 2018-06-15 DIAGNOSIS — Z00.00 ENCOUNTER FOR MEDICARE ANNUAL WELLNESS EXAM: Primary | ICD-10-CM

## 2018-06-15 DIAGNOSIS — Z00.00 ENCOUNTER FOR ANNUAL HEALTH EXAMINATION: ICD-10-CM

## 2018-06-15 DIAGNOSIS — Z12.5 PROSTATE CANCER SCREENING: ICD-10-CM

## 2018-06-15 DIAGNOSIS — Z94.0 STATUS POST KIDNEY TRANSPLANT: ICD-10-CM

## 2018-06-15 DIAGNOSIS — E78.00 PURE HYPERCHOLESTEROLEMIA: ICD-10-CM

## 2018-06-15 DIAGNOSIS — Z13.31 DEPRESSION SCREENING: ICD-10-CM

## 2018-06-15 PROCEDURE — G0444 DEPRESSION SCREEN ANNUAL: HCPCS | Performed by: FAMILY MEDICINE

## 2018-06-15 PROCEDURE — G0439 PPPS, SUBSEQ VISIT: HCPCS | Performed by: FAMILY MEDICINE

## 2018-06-15 RX ORDER — DIAZEPAM 5 MG/1
TABLET ORAL
Qty: 30 TABLET | Refills: 0 | Status: SHIPPED | OUTPATIENT
Start: 2018-06-15 | End: 2018-07-28

## 2018-06-15 RX ORDER — LATANOPROST 50 UG/ML
1 SOLUTION/ DROPS OPHTHALMIC NIGHTLY
COMMUNITY
End: 2018-12-14

## 2018-06-15 NOTE — PATIENT INSTRUCTIONS
Continue current medications   Check fasting labs, next blood draw at hospital.   Advice low salt diet and exercise. Monitor your blood pressure. Return to clinic if systolic blood pressure more than 047 or diastolic more than 918. Monitor glucose level. than 100 cigarettes in their lifetime   • Anyone with a family history    Colorectal Cancer Screening Covered up to Age 76     Colonoscopy Screen   Covered every 10 years- more often if abnormal Colonoscopy,10 Years due on 03/16/2013 Update [facility] Chloe Trejo Only covered with a cut with metal- TD and TDaP Not covered by Medicare Part B) No orders found for this or any previous visit.  This may be covered with your prescription benefits, but Medicare does not cover unless Medically needed    Zoster (Not covered

## 2018-06-15 NOTE — PROGRESS NOTES
HPI:   Neelima Price is a 52year old male who presents for a Medicare Subsequent Annual Wellness visit (Pt already had Initial Annual Wellness). Patient is status post kidney and pancreas transplant. His glucose level has been stable.   His kidney document but we do not have it in [facility], and patient is instructed to get our office a copy of it for scanning into Epic. He smoked tobacco in the past but quit greater than 12 months ago.   Smoking status: Former Smoker MG Oral Tab Take 1 tablet (10 mg total) by mouth nightly.    ZOLPIDEM TARTRATE 10 MG Oral Tab TAKE 1 TABLET BY MOUTH NIGHTLY AT BEDTIME AS NEEDED FOR SLEEP   FLOVENT  MCG/ACT Inhalation Aerosol INHALE 1 PUFF BY MOUTH TWICE DAILY   calciTRIOL 0.25 MCG INFORMATION:   He  has a past medical history of ANEMIA; BACK PAIN; DIABETES; Diabetes ([address] Essential hypertension; HYPERLIPIDEMIA; Hyperlipidemia; and HYPERTENSION.     He  has a past surgical history that includes vit for macular hole (8/4/09); subconju both eyes   Ears:  Normal TM's and external ear canals, both ears   Nose: Nares normal, septum midline, mucosa normal, no drainage or sinus tenderness   Throat: Lips, mucosa, and tongue normal; teeth and gums normal   Neck: Supple, symmetrical, trachea mid issues and agrees to the plan. Reinforced healthy diet, lifestyle, and exercise. Continue current medications   Check fasting labs, next blood draw at hospital.   Advice low salt diet and exercise. Monitor your blood pressure.  Return to clinic if systoli Dilated Eye Exam 6/1/2018       Prostate Cancer Screening      PSA  Annually PSA due on 03/16/2013  Update Health Maintenance if applicable     Immunizations (Update Immunization Activity if applicable)     Influenza  Covered Annually 11/17/2017   Please g

## 2018-07-06 ENCOUNTER — OFFICE VISIT (OUTPATIENT)
Dept: FAMILY MEDICINE CLINIC | Facility: CLINIC | Age: 53
End: 2018-07-06

## 2018-07-06 VITALS
WEIGHT: 246.19 LBS | TEMPERATURE: 98 F | SYSTOLIC BLOOD PRESSURE: 142 MMHG | HEIGHT: 71.5 IN | RESPIRATION RATE: 14 BRPM | HEART RATE: 72 BPM | BODY MASS INDEX: 33.71 KG/M2 | DIASTOLIC BLOOD PRESSURE: 70 MMHG

## 2018-07-06 DIAGNOSIS — G47.33 OSA (OBSTRUCTIVE SLEEP APNEA): Primary | ICD-10-CM

## 2018-07-06 PROCEDURE — 99214 OFFICE O/P EST MOD 30 MIN: CPT | Performed by: FAMILY MEDICINE

## 2018-07-06 NOTE — PATIENT INSTRUCTIONS
Date: 7/6/2018    Your sleep study is scheduled:                                                                                           You have been scheduled for the following:    ____ PAP (Positive Airway Pressure) - This therapeutic test is used to c HMO insurance, please verify that you have an approved referral.   3. This is a NO SMOKING facility. 4. Let us know if you have severe claustrophobia or have chronic nasal problems.    5. If you have an illness (even a cold), please contact us to genethedu

## 2018-07-06 NOTE — PROGRESS NOTES
[facility]        HPI:   This is a 52year old male coming in for Patient presents with:  Sleep Problem: NAKUL follow up      HPI: Patient is here for follow-up of his obstructive sleep apnea.   He is overall doing well EFFC (%) - -   Sleep REM (%) - -   TOT Sleep TM (min) - -         Past Medical History:   Diagnosis Date   • ANEMIA    • BACK PAIN    • DIABETES     Dx at age 20   • Diabetes [facility])    • Essential hypertension    • HYPERLIPIDEMIA    • Hyperlipidemia    • HY mouth nightly. Disp: 90 tablet Rfl: 1   ZOLPIDEM TARTRATE 10 MG Oral Tab TAKE 1 TABLET BY MOUTH NIGHTLY AT BEDTIME AS NEEDED FOR SLEEP Disp: 90 tablet Rfl: 0   calciTRIOL 0.25 MCG Oral Cap Take 1 capsule by mouth every other day.    Disp:  Rfl: 3   HYDROcod Aerosol INHALE 1 PUFF BY MOUTH TWICE DAILY Disp: 12 g Rfl: 0   Dextromethorphan-Guaifenesin (ROBITUSSIN DM OR) Take by mouth as needed.  Disp:  Rfl:    hydrocortisone (ANUSOL-HC) 2.5 % Rectal Cream  Disp:  Rfl:    Ondansetron HCl (ZOFRAN) 4 mg tablet Take 4 Right Ear: External ear normal.   Left Ear: External ear normal.   Nose: Nose normal.   Mouth/Throat: Oropharynx is clear and moist.   Eyes: Conjunctivae and EOM are normal. Pupils are equal, round, and reactive to light. Neck: Normal range of motion. give us permission to do so. Follow Up Appointment:    Please schedule for your follow up appointment to review the results of your testing in one to two weeks after the testing with Dr. Wm Gee at [phone].   This is currently scheduled for:________ Follow-up and Disposition     Disposition    Return if symptoms worsen or fail to improve.                 Advised if still with sleep apnea and not using CPAP has a  7 fold increase in risk of heart attack, stroke, abnormal heart rhythm  and d

## 2018-07-13 ENCOUNTER — TELEPHONE (OUTPATIENT)
Dept: FAMILY MEDICINE CLINIC | Facility: CLINIC | Age: 53
End: 2018-07-13

## 2018-07-13 NOTE — TELEPHONE ENCOUNTER
wants to know what he is supposed to do for the sleept study - said he was suppposed to have had a call letting him know but has not heard back yet

## 2018-07-13 NOTE — TELEPHONE ENCOUNTER
Patient states he is supposed to have sleep study done at [facility] sleep lab  Was in to see Dr. Ginny Armendariz on 7/6/18 and Dr. Ginny Armendariz ordered a BiPap Titration Study  Patient states that they talked about having it done on 7/19/18 but did not discuss a time  Patien

## 2018-07-18 NOTE — TELEPHONE ENCOUNTER
Patient informed of the below. States he just received a call April as well informing him of his appt time.

## 2018-07-19 ENCOUNTER — OFFICE VISIT (OUTPATIENT)
Dept: SLEEP CENTER | Age: 53
End: 2018-07-19
Attending: FAMILY MEDICINE
Payer: MEDICARE

## 2018-07-19 DIAGNOSIS — G47.33 OSA (OBSTRUCTIVE SLEEP APNEA): ICD-10-CM

## 2018-07-20 ENCOUNTER — TELEPHONE (OUTPATIENT)
Dept: FAMILY MEDICINE CLINIC | Facility: CLINIC | Age: 53
End: 2018-07-20

## 2018-07-20 NOTE — TELEPHONE ENCOUNTER
Please inform patient that his cologuard test was negative. This indicates that there is a lower likelihood that he has colorectal cancer.

## 2018-07-23 RX ORDER — ZOLPIDEM TARTRATE 10 MG/1
TABLET ORAL
Qty: 90 TABLET | Refills: 1 | Status: SHIPPED | OUTPATIENT
Start: 2018-07-23 | End: 2018-09-19

## 2018-07-23 NOTE — TELEPHONE ENCOUNTER
Future Appointments  Date Time Provider Yvonne Nae   12/14/2018 8:00 AM Gregg Betancur MD [facility] [place] [facility] [place]      Return in 6 months (on 12/15/2018).

## 2018-07-26 ENCOUNTER — OFFICE VISIT (OUTPATIENT)
Dept: SLEEP CENTER | Age: 53
End: 2018-07-26
Attending: FAMILY MEDICINE
Payer: MEDICARE

## 2018-07-26 DIAGNOSIS — G47.33 OSA (OBSTRUCTIVE SLEEP APNEA): ICD-10-CM

## 2018-07-26 PROCEDURE — 95811 POLYSOM 6/>YRS CPAP 4/> PARM: CPT

## 2018-07-27 RX ORDER — METOPROLOL TARTRATE 50 MG/1
TABLET, FILM COATED ORAL
Qty: 180 TABLET | Refills: 0 | Status: SHIPPED | OUTPATIENT
Start: 2018-07-27 | End: 2018-10-15

## 2018-07-27 NOTE — TELEPHONE ENCOUNTER
Future Appointments  Date Time Provider Yvonne Nae   12/14/2018 8:00 AM Salma Eldridge MD [facility] [place] [facility] [place]      Return in 6 months (on 12/15/2018).

## 2018-07-30 RX ORDER — DIAZEPAM 5 MG/1
TABLET ORAL
Qty: 30 TABLET | Refills: 2 | Status: SHIPPED | OUTPATIENT
Start: 2018-07-30 | End: 2019-01-21

## 2018-07-30 NOTE — PROCEDURES
[address] [address],[facility] [address]       Accredited by the [facility] of Sleep Medicine (AASM)    PATIENT'S NAME:        Lizzie EMERSON  ATTENDING PHYSICIAN:   Josse Dawson MD  REFERRING PHYSICIAN:   MD PABLO Grimes minutes. The patient spent 2.6% of the total sleep time in N1, 70% in N2, 8.6 in N3, and 18.9% in REM. Sleep latency was 9.1 minutes. REM latency was 159.0 minutes, which is delayed. Sleep efficiency was 73.4%, which is adequate.   Sleep maintenance eff

## 2018-07-31 ENCOUNTER — TELEPHONE (OUTPATIENT)
Dept: FAMILY MEDICINE CLINIC | Facility: CLINIC | Age: 53
End: 2018-07-31

## 2018-07-31 DIAGNOSIS — G47.33 OSA TREATED WITH BIPAP: Primary | ICD-10-CM

## 2018-07-31 NOTE — TELEPHONE ENCOUNTER
----- Message from Belia Gramajo MD sent at 7/31/2018  8:49 AM CDT -----  Update flowsheet,   Change bipap pressure/ may need to change to different machine. Fax all orders to [facility]. As needed. Follow up appointment 2 weeks after using bipap.

## 2018-07-31 NOTE — TELEPHONE ENCOUNTER
Patient had Bipap Titration Study done on 7/26/18 at [facility] in [place]  Patient needs to be set up with Bipap machine through [address] into flowsheet  Left message for patient to call office.     Script and sleep study needs to be fax

## 2018-08-01 NOTE — TELEPHONE ENCOUNTER
Patient informed the below results and recommendations. Patient will contact [facility] tomorrow to see about getting set up with a new machine and then c/b to schedule a f/u appt 2-3 weeks after getting machine. Please place order.

## 2018-08-20 RX ORDER — DEXAMETHASONE 4 MG/1
TABLET ORAL
Qty: 12 G | Refills: 0 | Status: SHIPPED | OUTPATIENT
Start: 2018-08-20 | End: 2019-08-01

## 2018-08-20 NOTE — TELEPHONE ENCOUNTER
Future appt:     Your appointments     Date & Time Appointment Department [facility])    Dec 14, 2018  8:00 AM CST Follow up - Extended with Slick Urban, [address] [place] [address] Nick Plata ([place])        [facility]

## 2018-08-21 ENCOUNTER — MED REC SCAN ONLY (OUTPATIENT)
Dept: FAMILY MEDICINE CLINIC | Facility: CLINIC | Age: 53
End: 2018-08-21

## 2018-08-31 ENCOUNTER — OFFICE VISIT (OUTPATIENT)
Dept: FAMILY MEDICINE CLINIC | Facility: CLINIC | Age: 53
End: 2018-08-31
Payer: MEDICARE

## 2018-08-31 VITALS
DIASTOLIC BLOOD PRESSURE: 68 MMHG | WEIGHT: 240 LBS | HEIGHT: 72 IN | TEMPERATURE: 99 F | RESPIRATION RATE: 22 BRPM | BODY MASS INDEX: 32.51 KG/M2 | SYSTOLIC BLOOD PRESSURE: 138 MMHG | HEART RATE: 68 BPM

## 2018-08-31 DIAGNOSIS — E01.0 THYROMEGALY: ICD-10-CM

## 2018-08-31 DIAGNOSIS — J31.0 CHRONIC RHINITIS: Primary | ICD-10-CM

## 2018-08-31 DIAGNOSIS — G47.61 PLMD (PERIODIC LIMB MOVEMENT DISORDER): ICD-10-CM

## 2018-08-31 DIAGNOSIS — G47.33 OSA (OBSTRUCTIVE SLEEP APNEA): ICD-10-CM

## 2018-08-31 PROCEDURE — 99214 OFFICE O/P EST MOD 30 MIN: CPT | Performed by: FAMILY MEDICINE

## 2018-08-31 RX ORDER — NIFEDIPINE 30 MG/1
TABLET, FILM COATED, EXTENDED RELEASE ORAL 2 TIMES DAILY
Refills: 3 | COMMUNITY
Start: 2018-07-29 | End: 2021-02-18

## 2018-08-31 NOTE — PROGRESS NOTES
[facility]        HPI:   This is a 52year old male coming in for Patient presents with:  Obstructive Sleep Apnea (NAKUL): follow up      HPI:  Pt is intermittently having difficulty with burping in the am, and wasn't [facility]   Sleep Study CM (CM H2O) - 21/17   Sleep EFFC (%) - 73.4%   Sleep REM (%) - 18.9%   TOT Sleep TM (min) - 389.5         Past Medical History:   Diagnosis Date   • ANEMIA    • BACK PAIN    • DIABETES     Dx at age 20   • Diabetes BY MOUTH NIGHTLY AT BEDTIME AS NEEDED FOR SLEEP Disp: 90 tablet Rfl: 1   Aluminum & Magnesium Hydroxide 200-200 MG/5ML Oral Suspension Take 400 mg by mouth. Disp:  Rfl:    latanoprost 0.005 % Ophthalmic Solution Place 1 drop into both eyes nightly.  Disp: MG Oral Tablet 24 Hr Take 30 mg by mouth daily. Disp:  Rfl:    Ondansetron HCl (ZOFRAN) 4 mg tablet Take 4 mg by mouth every 8 (eight) hours as needed. Disp:  Rfl:    Polyethylene Glycol 3350 Oral Powder as needed.    Disp:  Rfl:    tacrolimus 1 MG Oral signs reviewed. Physical Exam   Constitutional: He is oriented to person, place, and time. He appears well-developed and well-nourished. HENT:   Head: Normocephalic and atraumatic.    Right Ear: External ear normal.   Left Ear: External ear normal.   Nos a  7 fold increase in risk of heart attack, stroke, abnormal heart rhythm  and death,  increased risk of driving accidents. Advised to refrain from driving when sleepy. COMPLIANCE is required by insurance for 4 hours a night most nights of the week.

## 2018-09-01 ENCOUNTER — LABORATORY ENCOUNTER (OUTPATIENT)
Dept: LAB | Age: 53
End: 2018-09-01
Attending: FAMILY MEDICINE
Payer: MEDICARE

## 2018-09-01 DIAGNOSIS — I10 ESSENTIAL HYPERTENSION: ICD-10-CM

## 2018-09-01 DIAGNOSIS — Z12.5 PROSTATE CANCER SCREENING: ICD-10-CM

## 2018-09-01 DIAGNOSIS — E78.00 PURE HYPERCHOLESTEROLEMIA: ICD-10-CM

## 2018-09-01 DIAGNOSIS — J31.0 CHRONIC RHINITIS: ICD-10-CM

## 2018-09-01 DIAGNOSIS — N18.6 END STAGE RENAL DISEASE (HCC): ICD-10-CM

## 2018-09-01 DIAGNOSIS — G25.81 RLS (RESTLESS LEGS SYNDROME): ICD-10-CM

## 2018-09-01 LAB
ALBUMIN SERPL-MCNC: 3.7 G/DL (ref 3.5–4.8)
ALBUMIN/GLOB SERPL: 1.2 {RATIO} (ref 1–2)
ALP LIVER SERPL-CCNC: 98 U/L (ref 45–117)
ALT SERPL-CCNC: 32 U/L (ref 17–63)
ANION GAP SERPL CALC-SCNC: 10 MMOL/L (ref 0–18)
AST SERPL-CCNC: 20 U/L (ref 15–41)
BASOPHILS # BLD AUTO: 0.03 X10(3) UL (ref 0–0.1)
BASOPHILS NFR BLD AUTO: 0.5 %
BILIRUB SERPL-MCNC: 0.6 MG/DL (ref 0.1–2)
BUN BLD-MCNC: 21 MG/DL (ref 8–20)
BUN/CREAT SERPL: 14.8 (ref 10–20)
CALCIUM BLD-MCNC: 9.1 MG/DL (ref 8.3–10.3)
CHLORIDE SERPL-SCNC: 106 MMOL/L (ref 101–111)
CHOLEST SMN-MCNC: 121 MG/DL (ref ?–200)
CO2 SERPL-SCNC: 24 MMOL/L (ref 22–32)
COMPLEXED PSA SERPL-MCNC: 0.99 NG/ML (ref 0.01–4)
CREAT BLD-MCNC: 1.42 MG/DL (ref 0.7–1.3)
DEPRECATED HBV CORE AB SER IA-ACNC: 121.1 NG/ML (ref 30–530)
EOSINOPHIL # BLD AUTO: 0 X10(3) UL (ref 0–0.3)
EOSINOPHIL NFR BLD AUTO: 0 %
ERYTHROCYTE [DISTWIDTH] IN BLOOD BY AUTOMATED COUNT: 13.3 % (ref 11.5–16)
GLOBULIN PLAS-MCNC: 3.2 G/DL (ref 2.5–4)
GLUCOSE BLD-MCNC: 130 MG/DL (ref 70–99)
HAV AB SERPL IA-ACNC: 981 PG/ML (ref 193–986)
HCT VFR BLD AUTO: 44.2 % (ref 37–53)
HDLC SERPL-MCNC: 70 MG/DL (ref 40–59)
HGB BLD-MCNC: 14.4 G/DL (ref 13–17)
IMMATURE GRANULOCYTE COUNT: 0.02 X10(3) UL (ref 0–1)
IMMATURE GRANULOCYTE RATIO %: 0.3 %
LDLC SERPL CALC-MCNC: 41 MG/DL (ref ?–100)
LYMPHOCYTES # BLD AUTO: 1.07 X10(3) UL (ref 0.9–4)
LYMPHOCYTES NFR BLD AUTO: 16.6 %
M PROTEIN MFR SERPL ELPH: 6.9 G/DL (ref 6.1–8.3)
MCH RBC QN AUTO: 30.5 PG (ref 27–33.2)
MCHC RBC AUTO-ENTMCNC: 32.6 G/DL (ref 31–37)
MCV RBC AUTO: 93.6 FL (ref 80–99)
MONOCYTES # BLD AUTO: 0.63 X10(3) UL (ref 0.1–1)
MONOCYTES NFR BLD AUTO: 9.8 %
NEUTROPHIL ABS PRELIM: 4.69 X10 (3) UL (ref 1.3–6.7)
NEUTROPHILS # BLD AUTO: 4.69 X10(3) UL (ref 1.3–6.7)
NEUTROPHILS NFR BLD AUTO: 72.8 %
NONHDLC SERPL-MCNC: 51 MG/DL (ref ?–130)
OSMOLALITY SERPL CALC.SUM OF ELEC: 295 MOSM/KG (ref 275–295)
PLATELET # BLD AUTO: 291 10(3)UL (ref 150–450)
POTASSIUM SERPL-SCNC: 4.5 MMOL/L (ref 3.6–5.1)
RBC # BLD AUTO: 4.72 X10(6)UL (ref 4.3–5.7)
RED CELL DISTRIBUTION WIDTH-SD: 45.1 FL (ref 35.1–46.3)
SODIUM SERPL-SCNC: 140 MMOL/L (ref 136–144)
TRIGL SERPL-MCNC: 50 MG/DL (ref 30–149)
TSI SER-ACNC: 0.43 MIU/ML (ref 0.35–5.5)
VIT D+METAB SERPL-MCNC: 39 NG/ML (ref 30–100)
VLDLC SERPL CALC-MCNC: 10 MG/DL (ref 0–30)
WBC # BLD AUTO: 6.4 X10(3) UL (ref 4–13)

## 2018-09-01 PROCEDURE — 80053 COMPREHEN METABOLIC PANEL: CPT

## 2018-09-01 PROCEDURE — 82607 VITAMIN B-12: CPT

## 2018-09-01 PROCEDURE — 86003 ALLG SPEC IGE CRUDE XTRC EA: CPT

## 2018-09-01 PROCEDURE — 80061 LIPID PANEL: CPT

## 2018-09-01 PROCEDURE — 84443 ASSAY THYROID STIM HORMONE: CPT

## 2018-09-01 PROCEDURE — 82306 VITAMIN D 25 HYDROXY: CPT

## 2018-09-01 PROCEDURE — 85025 COMPLETE CBC W/AUTO DIFF WBC: CPT

## 2018-09-01 PROCEDURE — 82785 ASSAY OF IGE: CPT

## 2018-09-01 PROCEDURE — 36415 COLL VENOUS BLD VENIPUNCTURE: CPT

## 2018-09-01 PROCEDURE — 82728 ASSAY OF FERRITIN: CPT

## 2018-09-03 LAB
ALLERGEN,  IGE: <0.1 KU/L
ALLERGEN,  SHRIMP IGE: <0.1 KU/L
ALLERGEN,  TREE IGE: <0.1 KU/L
ALLERGEN, A.ALTERNATA(TENUIS): <0.1 KU/L
ALLERGEN, BERMUDA GRASS IGE: <0.1 KU/L
ALLERGEN, BOX ELDER/MAPLE IGE: <0.1 KU/L
ALLERGEN, CAT DANDER IGE: <0.1 KU/L
ALLERGEN, CLAM IGE: <0.1 KU/L
ALLERGEN, CODFISH: <0.1 KU/L
ALLERGEN, CORN IGE: <0.1 KU/L
ALLERGEN, COTTONWOOD IGE: <0.1 KU/L
ALLERGEN, D. FARINAE IGE: <0.1 KU/L
ALLERGEN, D.PTERONYSSINUS IGE: <0.1 KU/L
ALLERGEN, DOG DANDER IGE: <0.1 KU/L
ALLERGEN, EGG WHITE IGE: <0.1 KU/L
ALLERGEN, GERMAN COCKROACH IGE: <0.1 KU/L
ALLERGEN, HORMODENDRUM IGE: <0.1 KU/L
ALLERGEN, MARSH ELDER IGE: <0.1 KU/L
ALLERGEN, MILK (COW) IGE: <0.1 KU/L
ALLERGEN, MILK (COW) IGE: <0.1 KU/L
ALLERGEN, MOUNTAIN CEDAR IGE: <0.1 KU/L
ALLERGEN, MOUSE EPITHE IGE: <0.1 KU/L
ALLERGEN, MUCOR RACEMOSUS IGE: <0.1 KU/L
ALLERGEN, OAK TREE IGE: <0.1 KU/L
ALLERGEN, PEANUT IGE: <0.1 KU/L
ALLERGEN, PEANUT IGE: <0.1 KU/L
ALLERGEN, PECAN TREE IGE: <0.1 KU/L
ALLERGEN, PENICILLIUM NOTATUM: <0.1 KU/L
ALLERGEN, PIGWEED IGE: <0.1 KU/L
ALLERGEN, RUSSIAN THISTLE IGE: <0.1 KU/L
ALLERGEN, SCALLOP IGE: <0.1 KU/L
ALLERGEN, SHORT RAGWEED IGE: <0.1 KU/L
ALLERGEN, SOYBEAN IGE: <0.1 KU/L
ALLERGEN, TIMOTHY GRASS IGE: <0.1 KU/L
ALLERGEN, WALNUT TREE IGE: <0.1 KU/L
ALLERGEN, WALNUT/BLACK WALNUT: <0.1 KU/L
ALLERGEN, WHEAT IGE: <0.1 KU/L
ALLERGEN, WHITE ASH IGE: <0.1 KU/L
ALLERGEN, WHITE MULBERRY IGE: <0.1 KU/L
ALLERGEN,ASPERGILLUS FUMIGATUS: <0.1 KU/L
IMMUNOGLOBULIN E: 24 KU/L
IMMUNOGLOBULIN E: 24 KU/L

## 2018-09-04 ENCOUNTER — TELEPHONE (OUTPATIENT)
Dept: FAMILY MEDICINE CLINIC | Facility: CLINIC | Age: 53
End: 2018-09-04

## 2018-09-04 DIAGNOSIS — J31.0 CHRONIC RHINITIS: Primary | ICD-10-CM

## 2018-09-04 LAB
A/G RATIO: 1.2
ALBUMIN: 3.8 G/DL
ALKALINE PHOSPHATASE: 74
ALT: 32
AMYLASE: 79
ANION GAP: 9
AST: 26
BILIRUBIN, TOTAL: 0.8 MG/DL
BUN/CREATININE RATIO: 13
BUN: 19
CALCIUM: 9.4
CHLORIDE: 105
CO2: 25.4
CREATININE: 1.46 MG/DL
GFR AFRICAN AMERICAN: 64
GFR NON-AFRICAN AMERICAN: 53
GLOBULIN: 3.3
GLUCOSE BLOOD: 96
HEMATOCRIT: 45.8 %
HEMOGLOBIN A1C: 5.9
HEMOGLOBIN: 15.4 G/DL (ref 13–17)
LIPASE: 104
MAGNESIUM: 1.7
MCH: 30.9 PG
MCHC: 33.7 G/DL
MCV: 91.8 FL
MPV: 8.4
PHOSPHORUS: 2.8
PLATELETS: 265 X10E3/UL
POTASSIUM: 4.5
POTASSIUM: 4.5
PTH, INTACT: 79.4 PG/ML
RBC: 5 /HPF
RDW-CV: 15.2
SODIUM: 139
TOTAL PROTEIN: 7.1
WBC: 6.9 /HPF

## 2018-09-04 NOTE — TELEPHONE ENCOUNTER
Patient notified of results and recommendations and expressed understanding.   Referral in chart for Dr. Cristin Thayer  Patient given copy of referral with contact number    Luciano Navarrete, 09/04/18, 5:13 PM

## 2018-09-04 NOTE — TELEPHONE ENCOUNTER
----- Message from Kimberley Frye MD sent at 9/3/2018  6:57 PM CDT -----  Please inform patient that his cbc, cmp, lipid panel, tsh and psa is ok.

## 2018-09-04 NOTE — TELEPHONE ENCOUNTER
----- Message from Shashi Acosta MD sent at 9/4/2018  8:14 AM CDT -----  Congestion is not related to allergies. Recommend patient see ENT. Would recommend Dr. Cristin Thayer in [place].

## 2018-09-05 ENCOUNTER — TELEPHONE (OUTPATIENT)
Dept: FAMILY MEDICINE CLINIC | Facility: CLINIC | Age: 53
End: 2018-09-05

## 2018-09-05 NOTE — TELEPHONE ENCOUNTER
----- Message from Rodrigo Woodard MD sent at 9/5/2018 10:47 AM CDT -----  Please inform patient that labs are ok, except low magnesium. Recommend patient to call his nephrologist. Also fax result to their office.

## 2018-09-18 ENCOUNTER — TELEPHONE (OUTPATIENT)
Dept: FAMILY MEDICINE CLINIC | Facility: CLINIC | Age: 53
End: 2018-09-18

## 2018-09-18 ENCOUNTER — HOSPITAL ENCOUNTER (OUTPATIENT)
Dept: ULTRASOUND IMAGING | Age: 53
Discharge: HOME | End: 2018-09-18
Attending: FAMILY MEDICINE
Payer: MEDICARE

## 2018-09-18 DIAGNOSIS — E01.0 THYROMEGALY: ICD-10-CM

## 2018-09-18 PROCEDURE — 76536 US EXAM OF HEAD AND NECK: CPT | Performed by: FAMILY MEDICINE

## 2018-09-18 NOTE — TELEPHONE ENCOUNTER
----- Message from Giuseppe Mcclelland MD sent at 9/18/2018  2:47 PM CDT -----  Recommend discussion with Dr Adams Prior,   However, I would recommend biopsy of largest nodule if not already done.

## 2018-09-19 ENCOUNTER — OFFICE VISIT (OUTPATIENT)
Dept: FAMILY MEDICINE CLINIC | Facility: CLINIC | Age: 53
End: 2018-09-19
Payer: MEDICARE

## 2018-09-19 VITALS
BODY MASS INDEX: 33.89 KG/M2 | RESPIRATION RATE: 18 BRPM | DIASTOLIC BLOOD PRESSURE: 62 MMHG | HEART RATE: 78 BPM | WEIGHT: 247.5 LBS | HEIGHT: 71.5 IN | TEMPERATURE: 96 F | SYSTOLIC BLOOD PRESSURE: 138 MMHG

## 2018-09-19 DIAGNOSIS — E04.2 MULTINODULAR THYROID: ICD-10-CM

## 2018-09-19 DIAGNOSIS — H26.9 CATARACT, UNSPECIFIED CATARACT TYPE, UNSPECIFIED LATERALITY: ICD-10-CM

## 2018-09-19 DIAGNOSIS — Z01.818 PREOPERATIVE EXAMINATION: Primary | ICD-10-CM

## 2018-09-19 DIAGNOSIS — J01.00 ACUTE NON-RECURRENT MAXILLARY SINUSITIS: ICD-10-CM

## 2018-09-19 DIAGNOSIS — I10 ESSENTIAL HYPERTENSION: ICD-10-CM

## 2018-09-19 PROCEDURE — 99214 OFFICE O/P EST MOD 30 MIN: CPT | Performed by: FAMILY MEDICINE

## 2018-09-19 PROCEDURE — 93000 ELECTROCARDIOGRAM COMPLETE: CPT | Performed by: FAMILY MEDICINE

## 2018-09-19 RX ORDER — CEFUROXIME AXETIL 250 MG/1
1 TABLET ORAL 2 TIMES DAILY
Refills: 0 | COMMUNITY
Start: 2018-09-14 | End: 2019-03-15 | Stop reason: ALTCHOICE

## 2018-09-19 NOTE — PROGRESS NOTES
[facility]  PRE[facility]    Chief Complaint:   Patient presents with:  Pre-Op Exam  Other: Also discuss thyroid      HPI:   Yoly De La Fuente is a 52year old male with a hx of cataracts and hypertension, who presents for a pre-operative p Onset   • Cancer Mother    • Cancer Sister    • Hypertension Brother      Allergies:    Amlodipine                  Comment:Other reaction(s): ankle swelling  Ace Inhibitors          Coughing    Comment:cough  Current Meds:    Current Outpatient Medication Oral Tab EC Take 1 tablet (40 mg total) by mouth daily. Disp: 90 tablet Rfl: 0   Sennosides-Docusate Sodium 8.6-50 MG Oral Tab Take by mouth. Disp:  Rfl:    predniSONE 5 MG Oral Tab Take 1 tablet by mouth daily.  Disp:  Rfl: 1   aspirin EC 81 MG Oral Tab EC bleeding or bruising. LYMPHATICS:  Denies enlarged nodes or history of splenectomy. PSYCHIATRIC:  Denies depression or anxiety. ENDOCRINOLOGIC:  Denies excessive sweating, cold or heat intolerance, polyuria or polydipsia.   ALLERGIES:  Denies allergic re affect appropriate, no depressed mood or anxiety      ASSESSMENT AND PLAN:   Therese Brambila was seen today for pre-op exam and other.     Diagnoses and all orders for this visit:    Preoperative examination  -     ELECTROCARDIOGRAM, COMPLETE    Cataract, unspecifi

## 2018-09-19 NOTE — PATIENT INSTRUCTIONS
After today's assessment  patient is at optimum health for surgery and relatively at low risk. There are no contraindication for procedure. Continue current medications  Finish the course of antibiotics. Return to clinic if no improvement.      See

## 2018-10-15 RX ORDER — METOPROLOL TARTRATE 50 MG/1
TABLET, FILM COATED ORAL
Qty: 180 TABLET | Refills: 0 | Status: SHIPPED | OUTPATIENT
Start: 2018-10-15 | End: 2018-11-26

## 2018-10-15 NOTE — TELEPHONE ENCOUNTER
Future Appointments   Date Time Provider [facility]   12/14/2018  8:00 AM James Langley MD [facility] [place] [facility] [place]      Return in about 3 months (around 12/19/2018).

## 2018-10-17 RX ORDER — ATORVASTATIN CALCIUM 10 MG/1
TABLET, FILM COATED ORAL
Qty: 90 TABLET | Refills: 0 | Status: SHIPPED | OUTPATIENT
Start: 2018-10-17 | End: 2018-11-26

## 2018-10-17 NOTE — TELEPHONE ENCOUNTER
Future Appointments   Date Time Provider Yvonne Nae   12/14/2018  8:00 AM Mireya Medellin MD [facility] [place] [facility] [place]      Return in about 3 months (around 12/19/2018).

## 2018-11-26 RX ORDER — ZOLPIDEM TARTRATE 10 MG/1
TABLET ORAL
Qty: 90 TABLET | Refills: 0 | Status: SHIPPED | OUTPATIENT
Start: 2018-11-26 | End: 2019-03-08

## 2018-11-26 RX ORDER — METOPROLOL TARTRATE 50 MG/1
TABLET, FILM COATED ORAL
Qty: 180 TABLET | Refills: 0 | Status: SHIPPED | OUTPATIENT
Start: 2018-11-26 | End: 2019-03-08

## 2018-11-26 RX ORDER — ATORVASTATIN CALCIUM 10 MG/1
TABLET, FILM COATED ORAL
Qty: 90 TABLET | Refills: 0 | Status: SHIPPED | OUTPATIENT
Start: 2018-11-26 | End: 2019-03-08

## 2018-11-26 NOTE — TELEPHONE ENCOUNTER
Future Appointments   Date Time Provider Yvonne Nae   12/14/2018  8:00 AM Luis Mensah MD [facility] [place] [facility] [place]         Return in about 3 months (around 12/19/2018).

## 2018-12-14 ENCOUNTER — OFFICE VISIT (OUTPATIENT)
Dept: FAMILY MEDICINE CLINIC | Facility: CLINIC | Age: 53
End: 2018-12-14
Payer: MEDICARE

## 2018-12-14 VITALS
SYSTOLIC BLOOD PRESSURE: 132 MMHG | HEART RATE: 72 BPM | TEMPERATURE: 97 F | OXYGEN SATURATION: 100 % | DIASTOLIC BLOOD PRESSURE: 62 MMHG | RESPIRATION RATE: 16 BRPM | HEIGHT: 71.5 IN | WEIGHT: 257 LBS | BODY MASS INDEX: 35.19 KG/M2

## 2018-12-14 DIAGNOSIS — Z12.83 SKIN CANCER SCREENING: ICD-10-CM

## 2018-12-14 DIAGNOSIS — I10 ESSENTIAL HYPERTENSION: Primary | ICD-10-CM

## 2018-12-14 DIAGNOSIS — Z94.0 STATUS POST KIDNEY TRANSPLANT: ICD-10-CM

## 2018-12-14 PROCEDURE — 99214 OFFICE O/P EST MOD 30 MIN: CPT | Performed by: FAMILY MEDICINE

## 2018-12-14 NOTE — PATIENT INSTRUCTIONS
Continue current medications  Recommend healthy diet, exercise and weight loss  Labs reviewed   Blood pressure stable. HgA1c stable. Recommend to see a dietician.

## 2018-12-14 NOTE — PROGRESS NOTES
[facility] [place]  PROGRESS NOTE  Chief Complaint:   Patient presents with:  Medication Follow-Up: 6 month       HPI:   This is a 52year old male with history of hypertension, status post kidney transplant presents for follow-up.   Patient is c History   Problem Relation Age of Onset   • Cancer Mother    • Cancer Sister    • Hypertension Brother      Allergies:     Toradol [Ketorolac *    RENAL INSUFFICIENCY    Comment:Renal transplant  Amlodipine                  Comment:Other reaction(s): ankle Rfl:    Vitamin D3 1000 units Oral Tab Take 1,000 Units by mouth daily. Disp:  Rfl:    Dextromethorphan-Guaifenesin (ROBITUSSIN DM OR) Take by mouth as needed. Disp:  Rfl:    Pantoprazole Sodium 40 MG Oral Tab EC Take 1 tablet (40 mg total) by mouth daily. enlarged nodes   PSYCHIATRIC:  Denies depression or anxiety. ENDOCRINOLOGIC:  Denies excessive sweating, cold or heat intolerance, polyuria or polydipsia.       EXAM:   /62 (BP Location: Left arm, Patient Position: Sitting, Cuff Size: large)   Pulse dietician.        Health Maintenance:  Pneumococcal PPSV23/PCV13 Highest Risk Adult(1 of 3 - PCV13) due on 03/16/1982  FIT Colorectal Screening due on 03/16/2013  Colonoscopy due on 07/18/2017  Influenza Vaccine(1) due on 09/01/2018    Patient/Caregiver Edu

## 2018-12-21 ENCOUNTER — TELEPHONE (OUTPATIENT)
Dept: FAMILY MEDICINE CLINIC | Facility: CLINIC | Age: 53
End: 2018-12-21

## 2018-12-21 NOTE — TELEPHONE ENCOUNTER
Dr Sadia Bello needs additional info on CT scan. Called Dr Radha Patel office @ [phone] and they said the results wont be dictated until Monday but once they are they will be faxed to our office.      Informed patient of this and he understood and had no oth

## 2018-12-21 NOTE — TELEPHONE ENCOUNTER
needs orders for MRI of Brain based on CT scan done by Dr Zeke Garcia ( sp )      please advise   -dropped off CT report

## 2019-01-18 ENCOUNTER — OFFICE VISIT (OUTPATIENT)
Dept: FAMILY MEDICINE CLINIC | Facility: CLINIC | Age: 54
End: 2019-01-18
Payer: MEDICARE

## 2019-01-18 VITALS
RESPIRATION RATE: 16 BRPM | OXYGEN SATURATION: 94 % | HEART RATE: 80 BPM | HEIGHT: 71.5 IN | DIASTOLIC BLOOD PRESSURE: 62 MMHG | WEIGHT: 254.63 LBS | TEMPERATURE: 98 F | BODY MASS INDEX: 34.87 KG/M2 | SYSTOLIC BLOOD PRESSURE: 124 MMHG

## 2019-01-18 DIAGNOSIS — G47.33 OSA (OBSTRUCTIVE SLEEP APNEA): ICD-10-CM

## 2019-01-18 DIAGNOSIS — G25.81 RLS (RESTLESS LEGS SYNDROME): Primary | ICD-10-CM

## 2019-01-18 PROCEDURE — 99214 OFFICE O/P EST MOD 30 MIN: CPT | Performed by: FAMILY MEDICINE

## 2019-01-18 NOTE — PROGRESS NOTES
[facility]        HPI:   This is a 52year old male coming in for Patient presents with:  Obstructive Sleep Apnea (NAKUL)      HPI:   Pt states he thinks hew as doing better until he got laid off ofr 3 weeks and then w H2O) - -   Sleep EFFC (%) - -   Sleep REM (%) - -   TOT Sleep TM (min) - -       No results found for: IRON, IRONTOT, TIBC, IRONSAT, TRANSFERRIN, TIBCP, IRONBIND, SAT, SATUR  Lab Results   Component Value Date    SHAR 121.1 09/01/2018     Lab Results   Comp MOUTH NIGHTLY Disp: 90 tablet Rfl: 0   ZOLPIDEM TARTRATE 10 MG Oral Tab TAKE 1 TABLET BY MOUTH NIGHTLY AT BEDTIME AS NEEDED FOR SLEEP Disp: 90 tablet Rfl: 0   METOPROLOL TARTRATE 50 MG Oral Tab TAKE 1 TABLET BY MOUTH TWICE DAILY Disp: 180 tablet Rfl: 0   C Multiple Vitamins-Minerals (MULTIVITAMIN ADULT) Oral Tab  Disp:  Rfl:    mycophenolate sodium 360 MG Oral Tab  mg 2 (two) times daily before meals.    Disp:  Rfl:    Ondansetron HCl (ZOFRAN) 4 mg tablet Take 4 mg by mouth every 8 (eight) hours as n 202 lb 15.4 oz    Vital signs reviewed. Physical Exam   Constitutional: He is oriented to person, place, and time. He appears well-developed and well-nourished. HENT:   Head: Normocephalic and atraumatic.    Right Ear: External ear normal.   Left Ear: Ex rhythm  and death,  increased risk of driving accidents. Advised to refrain from driving when sleepy. COMPLIANCE is required by insurance for 4 hours a night most nights of the week.   Recommend weight loss, and maintain and optimal BMI with Exercise 3

## 2019-01-18 NOTE — PATIENT INSTRUCTIONS
Saline nasal spray. Advised patient to change filters,masks,hoses  and tubes and equipment on a  regular schedule:     Filters should be changed every 2-4  weeks.    Seals/ cushions shall be changed every 1 month,   Hoses every 3 months,   CPAP mask, head

## 2019-01-21 RX ORDER — DIAZEPAM 5 MG/1
TABLET ORAL
Qty: 30 TABLET | Refills: 3 | OUTPATIENT
Start: 2019-01-21 | End: 2019-01-22

## 2019-01-21 NOTE — TELEPHONE ENCOUNTER
Please advise refill of Diazepam 5mg. Last Rx: 7/30/18    Future appt:     Your appointments     Date & Time Appointment Department [facility])    Apr 19, 2019  2:40 PM CDT Sleep Follow Up with Jase Chappell MD [address] [place] [address], Ryan Brady (E

## 2019-01-22 RX ORDER — DIAZEPAM 5 MG/1
TABLET ORAL
Qty: 30 TABLET | Refills: 3 | Status: SHIPPED | OUTPATIENT
Start: 2019-01-22 | End: 2019-06-15

## 2019-01-25 ENCOUNTER — HOSPITAL ENCOUNTER (OUTPATIENT)
Dept: GENERAL RADIOLOGY | Age: 54
Discharge: HOME | End: 2019-01-25
Attending: FAMILY MEDICINE
Payer: MEDICARE

## 2019-01-25 ENCOUNTER — TELEPHONE (OUTPATIENT)
Dept: FAMILY MEDICINE CLINIC | Facility: CLINIC | Age: 54
End: 2019-01-25

## 2019-01-25 ENCOUNTER — OFFICE VISIT (OUTPATIENT)
Dept: FAMILY MEDICINE CLINIC | Facility: CLINIC | Age: 54
End: 2019-01-25
Payer: MEDICARE

## 2019-01-25 VITALS
HEIGHT: 71.5 IN | DIASTOLIC BLOOD PRESSURE: 62 MMHG | TEMPERATURE: 98 F | BODY MASS INDEX: 35.19 KG/M2 | SYSTOLIC BLOOD PRESSURE: 118 MMHG | OXYGEN SATURATION: 99 % | WEIGHT: 257 LBS | HEART RATE: 81 BPM

## 2019-01-25 DIAGNOSIS — M79.605 LEFT LEG PAIN: Primary | ICD-10-CM

## 2019-01-25 DIAGNOSIS — M79.605 LEFT LEG PAIN: ICD-10-CM

## 2019-01-25 DIAGNOSIS — S89.92XA INJURY OF LEFT LOWER EXTREMITY, INITIAL ENCOUNTER: ICD-10-CM

## 2019-01-25 PROCEDURE — 99214 OFFICE O/P EST MOD 30 MIN: CPT | Performed by: FAMILY MEDICINE

## 2019-01-25 PROCEDURE — 73590 X-RAY EXAM OF LOWER LEG: CPT | Performed by: FAMILY MEDICINE

## 2019-01-25 NOTE — TELEPHONE ENCOUNTER
----- Message from Valentín Carvajal MD sent at 1/25/2019  9:08 AM CST -----  Please inform patient that radiologist report does not show any fracture. There is a soft tissue swelling in lower leg this is likely due to injury. Recommend ice pack and Tylenol as needed. Return to clinic if any concerns.

## 2019-01-25 NOTE — PROGRESS NOTES
[facility] [place]  PROGRESS NOTE  Chief Complaint:   Patient presents with:  Leg Pain      HPI:   This is a 52year old male with history of kidney transplant, hypertension presents to clinic complaining of left leg pain after he bumped his leg reaction(s): ankle swelling  Ace Inhibitors          Coughing  Current Meds:    Current Outpatient Medications:  Polyvinyl Alcohol-Povidone (REFRESH OP) Apply to eye as needed.  Disp:  Rfl:    ZOLPIDEM TARTRATE 10 MG Oral Tab TAKE 1 TABLET BY MOUTH NIGHTLY mouth 2 (two) times daily. Disp:  Rfl: 0   FLOVENT  MCG/ACT Inhalation Aerosol INHALE 1 PUFF BY MOUTH TWICE DAILY Disp: 12 g Rfl: 0   calciTRIOL 0.25 MCG Oral Cap Take 1 capsule by mouth every other day.    Disp:  Rfl: 3   HYDROcodone-acetaminophen 5 gallops. EXTREMITIES: No edema, no cyanosis, no clubbing, FROM, 2+ dorsalis pedis pulses bilaterally. SKIN: No rashes, no skin lesion, no bruising, good turgor. LYMPHATIC: No cervical lymphadenopathy, no other lymphadenopathy.   MUSCULOSKELETAL: Mild bru syndrome)     Multinodular thyroid      Renard Costa MD

## 2019-01-25 NOTE — TELEPHONE ENCOUNTER
Informed pt of his xray results. Pt will use ice packs and tylenol if no improvement pt will return to our office.

## 2019-02-05 ENCOUNTER — TELEPHONE (OUTPATIENT)
Dept: FAMILY MEDICINE CLINIC | Facility: CLINIC | Age: 54
End: 2019-02-05

## 2019-02-05 DIAGNOSIS — Z79.52 LONG TERM CURRENT USE OF SYSTEMIC STEROIDS: ICD-10-CM

## 2019-02-05 DIAGNOSIS — Z79.899 LONG-TERM USE OF HIGH-RISK MEDICATION: ICD-10-CM

## 2019-02-05 DIAGNOSIS — Z94.0 STATUS POST KIDNEY TRANSPLANT: Primary | ICD-10-CM

## 2019-02-05 NOTE — PROGRESS NOTES
Please inform patient that  transplant of his is recommending that patient has DEXA scan done and fax result to them. Order placed. Please inform patient to schedule appointment for DEXA scan as soon as possible.     Please fax result to  [facility]

## 2019-02-07 NOTE — TELEPHONE ENCOUNTER
Patient informed of the below recommendations. Phone call transferred to the front office to schedule the appt.

## 2019-02-15 ENCOUNTER — TELEPHONE (OUTPATIENT)
Dept: FAMILY MEDICINE CLINIC | Facility: CLINIC | Age: 54
End: 2019-02-15

## 2019-02-15 ENCOUNTER — HOSPITAL ENCOUNTER (OUTPATIENT)
Dept: BONE DENSITY | Age: 54
Discharge: HOME | End: 2019-02-15
Attending: FAMILY MEDICINE
Payer: MEDICARE

## 2019-02-15 DIAGNOSIS — Z23 NEED FOR VACCINATION FOR STREP PNEUMONIAE: Primary | ICD-10-CM

## 2019-02-15 DIAGNOSIS — Z79.899 LONG-TERM USE OF HIGH-RISK MEDICATION: ICD-10-CM

## 2019-02-15 DIAGNOSIS — Z79.52 LONG TERM CURRENT USE OF SYSTEMIC STEROIDS: ICD-10-CM

## 2019-02-15 DIAGNOSIS — Z94.0 STATUS POST KIDNEY TRANSPLANT: ICD-10-CM

## 2019-02-15 PROCEDURE — 77080 DXA BONE DENSITY AXIAL: CPT | Performed by: FAMILY MEDICINE

## 2019-02-15 NOTE — TELEPHONE ENCOUNTER
Patient notified and expressed understanding    Patient states he is looking for an update following some tests he had done with Dr. Tere Salazar (ENT)  States Dr. Tere Salazar wants to do some \"deeper scans\" to try to find out what is blocking his nasal passage

## 2019-02-15 NOTE — TELEPHONE ENCOUNTER
----- Message from Juliana Carey MD sent at 2/15/2019 11:19 AM CST -----  Please inform patient that DEXA scan does not show any evidence of osteoporosis or osteopenia.       Please fax result to [facility] transplant office at [phone]  Attention: Farideh Collado

## 2019-02-15 NOTE — TELEPHONE ENCOUNTER
Patient notified of results and recommendations and expressed understanding.   Results faxed    Char Hilton, 02/15/19, 1:21 PM

## 2019-02-15 NOTE — TELEPHONE ENCOUNTER
Patient states he keeps getting a message on [facility] that he needs a pneumonia shot  States he is willing to get this done if he needs to  Please advise and enter orders if needed    Kris Burgess, 02/15/19, 10:46 AM

## 2019-02-16 NOTE — TELEPHONE ENCOUNTER
According to last ENT note from Dec 21, 2018. Patient was suppose to see Neurology due to headache. There was mucocele in sinus that was seen, that is likely just mucous cyst, he can discuss this with ENT. He is seeing Dr Santa Pacheco for thyroid nodule.

## 2019-02-22 NOTE — TELEPHONE ENCOUNTER
Patient came in to office to discuss telephone call. Patient is very confused on what's going on.      Patient states that when he saw ENT he did say to see a neurologist but the patient mistaken him for saying nephrologist. Patient then didn't know who

## 2019-02-22 NOTE — TELEPHONE ENCOUNTER
I recommend patient go back to ENT for further explanation. According to last ENT note he was referred to Neurology for headache.

## 2019-02-25 NOTE — TELEPHONE ENCOUNTER
Left message for patient stating that Dr Viraj Harper was going to call ENT today and find more out for the patient.

## 2019-02-26 NOTE — TELEPHONE ENCOUNTER
Spoke with ENT yesterday. Pt did not mention his chronic sinus congestion and cpap difficulties,   Discussed with ENT,  They will call patient and reassess.

## 2019-03-08 RX ORDER — ATORVASTATIN CALCIUM 10 MG/1
TABLET, FILM COATED ORAL
Qty: 90 TABLET | Refills: 0 | Status: SHIPPED | OUTPATIENT
Start: 2019-03-08 | End: 2019-07-05

## 2019-03-08 RX ORDER — METOPROLOL TARTRATE 50 MG/1
TABLET, FILM COATED ORAL
Qty: 180 TABLET | Refills: 0 | Status: SHIPPED | OUTPATIENT
Start: 2019-03-08 | End: 2019-07-05

## 2019-03-08 RX ORDER — ZOLPIDEM TARTRATE 10 MG/1
TABLET ORAL
Qty: 90 TABLET | Refills: 0 | Status: SHIPPED | OUTPATIENT
Start: 2019-03-08 | End: 2019-03-08

## 2019-03-08 RX ORDER — ZOLPIDEM TARTRATE 10 MG/1
TABLET ORAL
Qty: 90 TABLET | Refills: 0 | Status: SHIPPED | OUTPATIENT
Start: 2019-03-08 | End: 2019-06-24

## 2019-03-08 NOTE — TELEPHONE ENCOUNTER
Future Appointments   Date Time Provider Yvonne Soni   4/19/2019  2:40 PM Britta Rome MD [facility] [place] [facility] [place]   6/21/2019  8:00 AM Wili Frank MD [facility] [place] [facility] [place]     LOV: 1/25/19

## 2019-03-15 ENCOUNTER — OFFICE VISIT (OUTPATIENT)
Dept: FAMILY MEDICINE CLINIC | Facility: CLINIC | Age: 54
End: 2019-03-15
Payer: MEDICARE

## 2019-03-15 VITALS
HEIGHT: 71.5 IN | SYSTOLIC BLOOD PRESSURE: 140 MMHG | RESPIRATION RATE: 18 BRPM | OXYGEN SATURATION: 98 % | DIASTOLIC BLOOD PRESSURE: 62 MMHG | BODY MASS INDEX: 33.86 KG/M2 | TEMPERATURE: 97 F | WEIGHT: 247.25 LBS | HEART RATE: 82 BPM

## 2019-03-15 DIAGNOSIS — H66.002 ACUTE SUPPURATIVE OTITIS MEDIA OF LEFT EAR WITHOUT SPONTANEOUS RUPTURE OF TYMPANIC MEMBRANE, RECURRENCE NOT SPECIFIED: Primary | ICD-10-CM

## 2019-03-15 DIAGNOSIS — Z23 IMMUNIZATION DUE: ICD-10-CM

## 2019-03-15 PROCEDURE — 99214 OFFICE O/P EST MOD 30 MIN: CPT | Performed by: FAMILY MEDICINE

## 2019-03-15 RX ORDER — AMOXICILLIN 500 MG/1
500 CAPSULE ORAL 3 TIMES DAILY
Qty: 30 CAPSULE | Refills: 0 | Status: SHIPPED | OUTPATIENT
Start: 2019-03-15 | End: 2019-03-25

## 2019-03-15 NOTE — PROGRESS NOTES
[address]  PROGRESS NOTE  Chief Complaint:   Patient presents with:  ER F/U: Ear still has fluid/also pt said he would like pneumonia shot today      HPI:   This is a 52year old male presents to clinic for evaluation and follow-up from Comment:Other reaction(s): ankle swelling  Ace Inhibitors          Coughing  Current Meds:    Current Outpatient Medications:  DM-guaiFENesin ER  MG Oral Tablet 12 Hr Take 1 tablet by mouth every 4 to 6 hours as needed.  Disp:  Rfl:    bree daily.   Disp:  Rfl:    Glucose Blood (FREESTYLE TEST) In Vitro Strip  Disp:  Rfl:    hydrocortisone (ANUSOL-HC) 2.5 % Rectal Cream  Disp:  Rfl:    Multiple Vitamins-Minerals (MULTIVITAMIN ADULT) Oral Tab  Disp:  Rfl:    mycophenolate sodium 360 MG Oral Ta EARS: External normal.  Left tympanic membrane erythematous and bulging. THROAT:  No post-pharyngeal erythema or exudate. Mouth:  No oral lesions or ulcerations, good dentition. NECK: Supple, no CLAD, no JVD, no thyromegaly.    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

## 2019-03-15 NOTE — PATIENT INSTRUCTIONS
Continue current medications  Start amoxicillin. Use tylenol as needed   Use flonase and claritin daily. Return to clinic in 1-2 weeks if no improvement. Sooner if symptoms gets worse.      Pneumonia vaccine in 7-10 days

## 2019-03-21 NOTE — PROGRESS NOTES
Call placed to Dr. Luciana Pires office, spoke with Keron Celestin,  via perfect [employer]. Pt requesting suppository. Awaiting orders. [facility] [place]  PROGRESS NOTE  Chief Complaint:   Patient presents with:  Hospital F/U: Severe muscle spasms & back pain      HPI:   This is a 45year old male presents complaining of low back pain and muscle spasm.   Patient was seen in [address] 7/15/2014  Smokeless tobacco: Never Used                      Alcohol use:  Yes              Comment: 2 beers a month    Social History Narrative    Single, works at [employer]      Family History:  Family History   Problem Relation Age of Onset   • C 360 MG Oral Tab  mg 2 (two) times daily before meals. Disp:  Rfl:    NIFEdipine ER Osmotic Release (NIFEDICAL XL) 30 MG Oral Tablet 24 Hr Take 30 mg by mouth daily.    Disp:  Rfl:    Ondansetron HCl (ZOFRAN) 4 mg tablet Take 4 mg by mouth every 8 (e Exam:  GEN:  Patient is alert, awake and oriented, well developed, well nourished, no acute distress. EYES:  Sclera anicteric, conjunctiva normal, PERRLA, EOMI. LUNGS: Clear to auscultation bilterally, no rales/rhonchi/wheezing.   HEART:  Regular rate and 03[birth year]16/1965  Annual Physical due on 03/16/1965  FIT Colorectal Screening due on 03/16/2013  Colonoscopy,10 Years due on 03/16/2013  PSA due on 03/16/2013    Patient/Caregiver Education: Patient/Caregiver Education: There are no barriers to learning.  Medical

## 2019-03-26 ENCOUNTER — TELEPHONE (OUTPATIENT)
Dept: FAMILY MEDICINE CLINIC | Facility: CLINIC | Age: 54
End: 2019-03-26

## 2019-03-26 NOTE — TELEPHONE ENCOUNTER
Re: finished antibotic last night / this morning -  but now was dizziness  sometime strong / weak   -     wanted you to know and what to do.    Thinks is the inner ear and still plugged

## 2019-03-27 NOTE — TELEPHONE ENCOUNTER
Patient walked in this afternoon to discuss phone call. Patient states that he occasionally feels lightheaded and dizzy but is still able to work and do regular things its just sometimes causes him to stop.  Patient states that he also has a ringing in hi

## 2019-03-28 ENCOUNTER — OFFICE VISIT (OUTPATIENT)
Dept: FAMILY MEDICINE CLINIC | Facility: CLINIC | Age: 54
End: 2019-03-28
Payer: MEDICARE

## 2019-03-28 VITALS
TEMPERATURE: 97 F | SYSTOLIC BLOOD PRESSURE: 140 MMHG | BODY MASS INDEX: 34.23 KG/M2 | HEIGHT: 71.5 IN | HEART RATE: 73 BPM | RESPIRATION RATE: 18 BRPM | DIASTOLIC BLOOD PRESSURE: 64 MMHG | WEIGHT: 250 LBS

## 2019-03-28 DIAGNOSIS — H60.392 OTHER INFECTIVE ACUTE OTITIS EXTERNA OF LEFT EAR: Primary | ICD-10-CM

## 2019-03-28 PROCEDURE — 99214 OFFICE O/P EST MOD 30 MIN: CPT | Performed by: FAMILY MEDICINE

## 2019-03-28 RX ORDER — NEOMYCIN SULFATE, POLYMYXIN B SULFATE, HYDROCORTISONE 3.5; 10000; 1 MG/ML; [USP'U]/ML; MG/ML
4 SOLUTION/ DROPS AURICULAR (OTIC) 3 TIMES DAILY
Qty: 1 BOTTLE | Refills: 0 | Status: SHIPPED | OUTPATIENT
Start: 2019-03-28 | End: 2019-06-07 | Stop reason: ALTCHOICE

## 2019-03-28 RX ORDER — CIPROFLOXACIN 500 MG/1
500 TABLET, FILM COATED ORAL 2 TIMES DAILY
Qty: 20 TABLET | Refills: 0 | Status: SHIPPED | OUTPATIENT
Start: 2019-03-28 | End: 2019-04-07

## 2019-03-28 NOTE — PROGRESS NOTES
[address]  PROGRESS NOTE  Chief Complaint:   Patient presents with:  Ear Problem: left ear fullness , 2 weeks.  amoxicillin  Dizziness  Lightheadedness  Ringing In Ear      HPI:   This is a 62year old male presents presents to clinic co [Ketorolac *    RENAL INSUFFICIENCY    Comment:Renal transplant  Amlodipine                  Comment:Other reaction(s): ankle swelling  Ace Inhibitors          Coughing  Current Meds:    Current Outpatient Medications:  Neomycin-Polymyxin-HC 1 % Otic Solut Sennosides-Docusate Sodium 8.6-50 MG Oral Tab Take 1 tablet by mouth daily. Disp:  Rfl:    predniSONE 5 MG Oral Tab Take 1 tablet by mouth daily. Disp:  Rfl: 1   aspirin EC 81 MG Oral Tab EC Take 81 mg by mouth daily.    Disp:  Rfl:    Glucose Blood (FR Normocephalic, atraumatic,   EYES: EOMI, PERRLA, no scleral icterus, conjunctivae clear bilaterally, no eye discharge  EARS: Right tympanic membrane normal.  Left outer ear canal erythematous, mild pus noted, tympanic membrane slightly erythematous.   Ava Bumps verbalizes understanding. Patient is notified to call with any questions, complications, allergies, or worsening or changing symptoms. Patient is to call with any side effects or complications from the treatments as a result of today.      Problem List:  P

## 2019-03-28 NOTE — PATIENT INSTRUCTIONS
Start ciprofloxacin and ear drops. Use tylenol as needed   See ENT if no improvement. Monitor blood pressure.

## 2019-04-15 ENCOUNTER — HOSPITAL ENCOUNTER (OUTPATIENT)
Dept: GENERAL RADIOLOGY | Age: 54
Discharge: HOME | End: 2019-04-15
Attending: NURSE PRACTITIONER
Payer: MEDICARE

## 2019-04-15 ENCOUNTER — OFFICE VISIT (OUTPATIENT)
Dept: FAMILY MEDICINE CLINIC | Facility: CLINIC | Age: 54
End: 2019-04-15
Payer: MEDICARE

## 2019-04-15 VITALS
WEIGHT: 254.81 LBS | HEART RATE: 86 BPM | OXYGEN SATURATION: 98 % | HEIGHT: 71.5 IN | TEMPERATURE: 98 F | SYSTOLIC BLOOD PRESSURE: 150 MMHG | DIASTOLIC BLOOD PRESSURE: 50 MMHG | BODY MASS INDEX: 34.89 KG/M2

## 2019-04-15 DIAGNOSIS — M25.561 PATELLAR PAIN, RIGHT: Primary | ICD-10-CM

## 2019-04-15 DIAGNOSIS — M25.551 RIGHT HIP PAIN: ICD-10-CM

## 2019-04-15 DIAGNOSIS — M25.561 PATELLAR PAIN, RIGHT: ICD-10-CM

## 2019-04-15 PROCEDURE — 99214 OFFICE O/P EST MOD 30 MIN: CPT | Performed by: NURSE PRACTITIONER

## 2019-04-15 PROCEDURE — 73562 X-RAY EXAM OF KNEE 3: CPT | Performed by: NURSE PRACTITIONER

## 2019-04-15 PROCEDURE — 73502 X-RAY EXAM HIP UNI 2-3 VIEWS: CPT | Performed by: NURSE PRACTITIONER

## 2019-04-15 RX ORDER — CYCLOBENZAPRINE HCL 10 MG
10 TABLET ORAL 3 TIMES DAILY
Qty: 30 TABLET | Refills: 0 | Status: SHIPPED | OUTPATIENT
Start: 2019-04-15 | End: 2019-06-07 | Stop reason: ALTCHOICE

## 2019-04-15 RX ORDER — ACETAMINOPHEN AND CODEINE PHOSPHATE 300; 30 MG/1; MG/1
1 TABLET ORAL EVERY 4 HOURS PRN
Qty: 20 TABLET | Refills: 0 | Status: SHIPPED
Start: 2019-04-15 | End: 2019-06-07 | Stop reason: ALTCHOICE

## 2019-04-15 NOTE — PATIENT INSTRUCTIONS
Do NOT drive on the pain medication. Rest, ice or heat therapy. NSAIDs (such as ibuprofen or Aleve) as directed. Consider physical therapy if symptoms increase or persist.  Return to clinic if symptoms worsen.

## 2019-04-15 NOTE — PROGRESS NOTES
HPI:    Patient ID: Nitesh Amaro is a 62year old male. HPI    Slipped on the ice this morning. Landed on his right knee. Went to chiropractor this morning. Is having the pain on the right side where he landed. Is having back and right hip.    Hx of predniSONE 5 MG Oral Tab Take 1 tablet by mouth daily. Disp:  Rfl: 1   aspirin EC 81 MG Oral Tab EC Take 81 mg by mouth daily.    Disp:  Rfl:    Glucose Blood (FREESTYLE TEST) In Vitro Strip  Disp:  Rfl:    Multiple Vitamins-Minerals (MULTIVITAMIN ADULT) Or Right hip: He exhibits tenderness. Right knee: He exhibits swelling. Tenderness found. Legs:  Nursing note and vitals reviewed. [facility] website reviewed.      Right hip - no acute findings, Pending radiology report            ASSESSME

## 2019-04-19 ENCOUNTER — OFFICE VISIT (OUTPATIENT)
Dept: FAMILY MEDICINE CLINIC | Facility: CLINIC | Age: 54
End: 2019-04-19
Payer: MEDICARE

## 2019-04-19 VITALS
DIASTOLIC BLOOD PRESSURE: 62 MMHG | OXYGEN SATURATION: 97 % | SYSTOLIC BLOOD PRESSURE: 130 MMHG | WEIGHT: 253 LBS | RESPIRATION RATE: 18 BRPM | TEMPERATURE: 97 F | HEART RATE: 64 BPM | HEIGHT: 71.5 IN | BODY MASS INDEX: 34.65 KG/M2

## 2019-04-19 DIAGNOSIS — E04.2 MULTINODULAR THYROID: ICD-10-CM

## 2019-04-19 DIAGNOSIS — B37.0 THRUSH: ICD-10-CM

## 2019-04-19 DIAGNOSIS — G47.33 OSA (OBSTRUCTIVE SLEEP APNEA): Primary | ICD-10-CM

## 2019-04-19 PROCEDURE — 87077 CULTURE AEROBIC IDENTIFY: CPT | Performed by: FAMILY MEDICINE

## 2019-04-19 PROCEDURE — 87205 SMEAR GRAM STAIN: CPT | Performed by: FAMILY MEDICINE

## 2019-04-19 PROCEDURE — 87070 CULTURE OTHR SPECIMN AEROBIC: CPT | Performed by: FAMILY MEDICINE

## 2019-04-19 PROCEDURE — 87186 SC STD MICRODIL/AGAR DIL: CPT | Performed by: FAMILY MEDICINE

## 2019-04-19 PROCEDURE — 99214 OFFICE O/P EST MOD 30 MIN: CPT | Performed by: FAMILY MEDICINE

## 2019-04-19 NOTE — PROGRESS NOTES
[facility]  [facility]        HPI:   This is a 62year old male coming in for Patient presents with:  Obstructive Sleep Apnea (NAKUL)      HPI:  Pt states his sleep is \"there\"  bIPAP IS HELPING.   He wakes up at 130 2 am and then 39.0 09/01/2018     Lab Results   Component Value Date    B12 981 09/01/2018         Past Medical History:   Diagnosis Date   • ANEMIA    • BACK PAIN    • DIABETES     Dx at age 20   • Diabetes [facility])    • Essential hypertension    • HYPERLIPIDEMIA    • Hyp Hr Take 1 tablet by mouth every 4 to 6 hours as needed.  Disp:  Rfl:    ATORVASTATIN 10 MG Oral Tab TAKE 1 TABLET BY MOUTH NIGHTLY Disp: 90 tablet Rfl: 0   METOPROLOL TARTRATE 50 MG Oral Tab TAKE 1 TABLET BY MOUTH TWICE DAILY Disp: 180 tablet Rfl: 0   Zolpi Rfl:    Polyethylene Glycol 3350 Oral Powder as needed.    Disp:  Rfl:    tacrolimus 1 MG Oral Cap Take 3 tablets (3 mg) by mouth in the morning and 2 tablets (2 mg) at night  Disp:  Rfl:       Counseling given: Not Answered         Problem List:  Patient Mouth/Throat: Oropharynx is clear and moist.   Posterior pharynx with thick white discharge and dry membranes. Eyes: Pupils are equal, round, and reactive to light. Conjunctivae and EOM are normal.   Neck: Normal range of motion. Neck supple.  No tra medical equipment provider. Meds & Refills for this Visit:  Requested Prescriptions      No prescriptions requested or ordered in this encounter       Outcome: Parent verbalizes understanding.  Parent is notified to call with any questions, complica

## 2019-04-22 ENCOUNTER — TELEPHONE (OUTPATIENT)
Dept: FAMILY MEDICINE CLINIC | Facility: CLINIC | Age: 54
End: 2019-04-22

## 2019-04-22 RX ORDER — CEPHALEXIN 250 MG/1
250 CAPSULE ORAL 3 TIMES DAILY
Qty: 42 CAPSULE | Refills: 0 | Status: SHIPPED | OUTPATIENT
Start: 2019-04-22 | End: 2019-05-06

## 2019-04-22 NOTE — TELEPHONE ENCOUNTER
Left message for pt to call the office. Results faxed to pt's ENT - Dr. Verenice Ardon at [phone].

## 2019-04-22 NOTE — TELEPHONE ENCOUNTER
----- Message from Terry Vaughan MD sent at 4/22/2019 12:14 PM CDT -----  Keflex 250 tid x 14 days. Follow up with ENT and transplant team.   Please notify both.

## 2019-04-22 NOTE — TELEPHONE ENCOUNTER
Pt notified and verbalized understanding. Results also faxed to Dr. Becky Ann at [phone] - transplant coordinator.

## 2019-05-07 ENCOUNTER — HOSPITAL ENCOUNTER (OUTPATIENT)
Dept: ULTRASOUND IMAGING | Age: 54
Discharge: HOME | End: 2019-05-07
Attending: FAMILY MEDICINE
Payer: MEDICARE

## 2019-05-07 ENCOUNTER — TELEPHONE (OUTPATIENT)
Dept: FAMILY MEDICINE CLINIC | Facility: CLINIC | Age: 54
End: 2019-05-07

## 2019-05-07 DIAGNOSIS — E04.2 MULTINODULAR THYROID: ICD-10-CM

## 2019-05-07 PROCEDURE — 76536 US EXAM OF HEAD AND NECK: CPT | Performed by: FAMILY MEDICINE

## 2019-05-07 NOTE — TELEPHONE ENCOUNTER
----- Message from Shashi Acosta MD sent at 5/7/2019 12:13 PM CDT -----  Multiple nodules present. Non concerning for cancer . Too small to biopsy at this time. Forward to entire care team including transplant team.   No change in current management.

## 2019-05-13 ENCOUNTER — MED REC SCAN ONLY (OUTPATIENT)
Dept: FAMILY MEDICINE CLINIC | Facility: CLINIC | Age: 54
End: 2019-05-13

## 2019-06-07 ENCOUNTER — OFFICE VISIT (OUTPATIENT)
Dept: FAMILY MEDICINE CLINIC | Facility: CLINIC | Age: 54
End: 2019-06-07
Payer: MEDICARE

## 2019-06-07 VITALS
HEART RATE: 94 BPM | BODY MASS INDEX: 34.51 KG/M2 | SYSTOLIC BLOOD PRESSURE: 128 MMHG | RESPIRATION RATE: 18 BRPM | HEIGHT: 71.5 IN | TEMPERATURE: 98 F | OXYGEN SATURATION: 96 % | DIASTOLIC BLOOD PRESSURE: 60 MMHG | WEIGHT: 252 LBS

## 2019-06-07 DIAGNOSIS — H65.191 OTHER NON-RECURRENT ACUTE NONSUPPURATIVE OTITIS MEDIA OF RIGHT EAR: Primary | ICD-10-CM

## 2019-06-07 DIAGNOSIS — J06.9 ACUTE UPPER RESPIRATORY INFECTION: ICD-10-CM

## 2019-06-07 PROBLEM — M54.50 LOW BACK PAIN: Status: ACTIVE | Noted: 2018-07-02

## 2019-06-07 PROBLEM — Z86.69 HX OF SCIATICA: Status: ACTIVE | Noted: 2018-07-02

## 2019-06-07 PROCEDURE — 99214 OFFICE O/P EST MOD 30 MIN: CPT | Performed by: NURSE PRACTITIONER

## 2019-06-07 RX ORDER — FLUTICASONE PROPIONATE 50 MCG
1 SPRAY, SUSPENSION (ML) NASAL DAILY
COMMUNITY

## 2019-06-07 RX ORDER — AMOXICILLIN AND CLAVULANATE POTASSIUM 875; 125 MG/1; MG/1
1 TABLET, FILM COATED ORAL 2 TIMES DAILY
Qty: 20 TABLET | Refills: 0 | Status: SHIPPED | OUTPATIENT
Start: 2019-06-07 | End: 2019-06-15 | Stop reason: ALTCHOICE

## 2019-06-07 NOTE — PROGRESS NOTES
CHIEF COMPLAINT:   Patient presents with:  Sinus Problem  Cough  Headache      HPI:   Ashley English is a 62year old male who presents to clinic today with complaints of sinus congestion, cough, and headache. Symptoms started 4 days ago.   Patient Take 500 mg by mouth 2 (two) times daily. Disp:  Rfl:    Pantoprazole Sodium 40 MG Oral Tab EC Take 1 tablet (40 mg total) by mouth daily. Disp: 90 tablet Rfl: 0   Sennosides-Docusate Sodium 8.6-50 MG Oral Tab Take 1 tablet by mouth daily.    Disp:  Rfl: rashes  HEENT: See HPI  LUNGS: No cough, shortness of breath, or wheezing. CARDIOVASCULAR: No chest pain, palpitations  GI: No nausea, vomiting, constipation, diarrhea. NEURO: Denies dizziness, numbness, nor tingling in face.  See HPI    EXAM:   /6 continue with his oral loratadine as well as nasal Flonase. Patient to trial Coricidin cold and cough medication for his sinus symptoms. Patient instructed to avoid Sudafed or other decongestants such as pseudoephedrine which can alter blood pressure.   Lea Mota symptoms of an infection proceed to the ER; you are on immune suppression medications so you're at a higher risk of developing infections.        Meds & Refills for this Visit:  Requested Prescriptions     Signed Prescriptions Disp Refills   • Amoxicillin-P

## 2019-06-07 NOTE — PATIENT INSTRUCTIONS
Coricidin cough and cold; do NOT take Sudafed or decongestants such as pseudoephedrine.    Mucinex (plain)  Ocean nasal saline spray  Neti pot (use distilled water)  Increase oral fluids  Augmentin twice a day for ten days for right ear infection; take with

## 2019-06-15 ENCOUNTER — OFFICE VISIT (OUTPATIENT)
Dept: FAMILY MEDICINE CLINIC | Facility: CLINIC | Age: 54
End: 2019-06-15
Payer: MEDICARE

## 2019-06-15 VITALS
SYSTOLIC BLOOD PRESSURE: 128 MMHG | HEIGHT: 71.5 IN | BODY MASS INDEX: 34.23 KG/M2 | RESPIRATION RATE: 18 BRPM | DIASTOLIC BLOOD PRESSURE: 72 MMHG | HEART RATE: 80 BPM | TEMPERATURE: 98 F | WEIGHT: 250 LBS

## 2019-06-15 DIAGNOSIS — Z00.00 ENCOUNTER FOR ANNUAL HEALTH EXAMINATION: ICD-10-CM

## 2019-06-15 DIAGNOSIS — I10 ESSENTIAL HYPERTENSION: ICD-10-CM

## 2019-06-15 DIAGNOSIS — Z00.00 ENCOUNTER FOR MEDICARE ANNUAL WELLNESS EXAM: Primary | ICD-10-CM

## 2019-06-15 PROCEDURE — G0439 PPPS, SUBSEQ VISIT: HCPCS | Performed by: FAMILY MEDICINE

## 2019-06-15 RX ORDER — DIAZEPAM 5 MG/1
5 TABLET ORAL EVERY 12 HOURS PRN
Qty: 30 TABLET | Refills: 3 | COMMUNITY
Start: 2019-06-15 | End: 2019-08-01

## 2019-06-15 NOTE — PROGRESS NOTES
HPI:   Stacey Esparza is a 62year old male who presents for a Medicare Subsequent Annual Wellness visit (Pt already had Initial Annual Wellness). Patient is status post kidney and pancreas transplant. Patient is currently on immunomodulators.   Cur Quit date: 7/15/2014        Years since quittin.9      Smokeless tobacco: Never Used         CAGE Alcohol screening   Neelima Car was screened for Alcohol abuse and had a score of 0 so is at low risk.      Patient Care Team: Patient Care Team:  Zaki NIGHTLY   METOPROLOL TARTRATE 50 MG Oral Tab TAKE 1 TABLET BY MOUTH TWICE DAILY   Zolpidem Tartrate 10 MG Oral Tab TAKE 1 TABLET BY MOUTH NIGHTLY AT BEDTIME AS NEEDED FOR SLEEP   Polyvinyl Alcohol-Povidone (REFRESH OP) Apply to eye as needed.    NIFEdipine He reports that he does not use drugs.      REVIEW OF SYSTEMS:   GENERAL: feels well otherwise  SKIN: denies any unusual skin lesions  EYES: denies blurred vision or double vision  HEENT: denies nasal congestion, sinus pain or ST  LUNGS: denies shortness of Vaccination History     Immunization History   Administered Date(s) Administered   • Bevacizumab, 10mg 08/31/2011, 10/05/2011, 11/15/2011, 01/17/2012, 03/20/2012, 05/29/2012, 07/31/2012   • FLULAVAL 6 months & older 0.5 ml Prefilled syringe ([id number]) 11/17 Ophthalmology Visit Annually: Diabetics, FHx Glaucoma, AA>50, > 65 Data entered on: 5/28/2019   Last Dilated Eye Exam 5/21/2019       Prostate Cancer Screening      PSA  Annually PSA due on 09/01/2020  Update Health Maintenance if applicable

## 2019-06-15 NOTE — PATIENT INSTRUCTIONS
Continue current medications  Continue to see nephrologist.   Had agnes done in 2018.        Marc Juan's SCREENING SCHEDULE   Tests on this list are recommended by your physician but may not be covered, or covered at this frequency, by your insur family history    Colorectal Cancer Screening Covered up to Age 76     Colonoscopy Screen   Covered every 10 years- more often if abnormal Colonoscopy due on 08/21/2018 Update Health Maintenance if applicable    Flex Sigmoidoscopy Screen  Covered every 5 y Illicit injectable drug abusers     Tetanus Toxoid- Only covered with a cut with metal- TD and TDaP Not covered by Medicare Part B) No orders found for this or any previous visit.  This may be covered with your prescription benefits, but Medicare does not

## 2019-06-24 NOTE — TELEPHONE ENCOUNTER
Future appt:     Your appointments     Date & Time Appointment Department [facility])    Oct 18, 2019  2:20 PM CDT Sleep Follow Up with Mario Ching MD [facility], [facility] ([facility])            [employer]

## 2019-06-26 RX ORDER — ZOLPIDEM TARTRATE 10 MG/1
TABLET ORAL
Qty: 90 TABLET | Refills: 0 | Status: SHIPPED | OUTPATIENT
Start: 2019-06-26 | End: 2019-09-30

## 2019-07-05 RX ORDER — METOPROLOL TARTRATE 50 MG/1
50 TABLET, FILM COATED ORAL 2 TIMES DAILY
Qty: 180 TABLET | Refills: 1 | Status: SHIPPED | OUTPATIENT
Start: 2019-07-05 | End: 2019-11-27

## 2019-07-05 RX ORDER — ATORVASTATIN CALCIUM 10 MG/1
TABLET, FILM COATED ORAL
Qty: 90 TABLET | Refills: 1 | Status: SHIPPED | OUTPATIENT
Start: 2019-07-05 | End: 2019-11-27

## 2019-07-05 NOTE — TELEPHONE ENCOUNTER
Future appt:     Your appointments     Date & Time Appointment Department [facility])    Oct 18, 2019  2:20 PM CDT Sleep Follow Up with Liz Powers MD [address] [place] [address], [facility]            [employer]

## 2019-08-01 NOTE — TELEPHONE ENCOUNTER
Future Appointments   Date Time Provider Yvonne Soni   10/18/2019  2:20 PM Jase Chappell MD [facility] [place]      Return in 6 months (on 12/15/2019) for follow up.

## 2019-08-02 RX ORDER — DEXAMETHASONE 4 MG/1
TABLET ORAL
Qty: 12 G | Refills: 0 | Status: SHIPPED | OUTPATIENT
Start: 2019-08-02 | End: 2019-09-30

## 2019-08-02 RX ORDER — DIAZEPAM 5 MG/1
TABLET ORAL
Qty: 30 TABLET | Refills: 0 | Status: SHIPPED | OUTPATIENT
Start: 2019-08-02 | End: 2019-09-30

## 2019-09-30 RX ORDER — DIAZEPAM 5 MG/1
TABLET ORAL
Qty: 30 TABLET | Refills: 2 | Status: SHIPPED | OUTPATIENT
Start: 2019-09-30 | End: 2020-02-07

## 2019-09-30 RX ORDER — ZOLPIDEM TARTRATE 10 MG/1
TABLET ORAL
Qty: 90 TABLET | Refills: 0 | Status: SHIPPED | OUTPATIENT
Start: 2019-09-30 | End: 2019-11-27

## 2019-09-30 NOTE — TELEPHONE ENCOUNTER
Future appt:     Your appointments     Date & Time Appointment Department [facility])    Oct 18, 2019  2:20 PM CDT Sleep Follow Up with Julia Cade MD 25 [facility], [facility] ([facility])            [employer]

## 2019-09-30 NOTE — TELEPHONE ENCOUNTER
Future appt:     Your appointments     Date & Time Appointment Department [facility])    Oct 18, 2019  2:20 PM CDT Sleep Follow Up with Verner Cones, MD 25 [facility], [facility] ([facility])            [employer]

## 2019-10-01 RX ORDER — DEXAMETHASONE 4 MG/1
TABLET ORAL
Qty: 12 G | Refills: 0 | Status: SHIPPED | OUTPATIENT
Start: 2019-10-01 | End: 2020-04-13

## 2019-10-01 NOTE — TELEPHONE ENCOUNTER
Future Appointments   Date Time Provider Yvonne Soni   10/18/2019  2:20 PM Monika Nino MD [facility] [place]      Return in 6 months (on 12/15/2019) for follow up.

## 2019-10-10 ENCOUNTER — TELEPHONE (OUTPATIENT)
Dept: FAMILY MEDICINE CLINIC | Facility: CLINIC | Age: 54
End: 2019-10-10

## 2019-10-10 NOTE — TELEPHONE ENCOUNTER
Pt has f/u appt scheduled:  Future Appointments   Date Time Provider Yvonne Soni   10/18/2019  2:20 PM Mike Godfrey MD [facility] [place] [facility]

## 2019-10-18 ENCOUNTER — OFFICE VISIT (OUTPATIENT)
Dept: FAMILY MEDICINE CLINIC | Facility: CLINIC | Age: 54
End: 2019-10-18
Payer: COMMERCIAL

## 2019-10-18 VITALS
HEART RATE: 76 BPM | WEIGHT: 251.19 LBS | HEIGHT: 71.5 IN | OXYGEN SATURATION: 98 % | DIASTOLIC BLOOD PRESSURE: 58 MMHG | SYSTOLIC BLOOD PRESSURE: 132 MMHG | BODY MASS INDEX: 34.4 KG/M2 | TEMPERATURE: 98 F | RESPIRATION RATE: 16 BRPM

## 2019-10-18 DIAGNOSIS — R01.1 CARDIAC MURMUR: ICD-10-CM

## 2019-10-18 DIAGNOSIS — I10 ESSENTIAL HYPERTENSION: ICD-10-CM

## 2019-10-18 DIAGNOSIS — G47.33 OSA (OBSTRUCTIVE SLEEP APNEA): Primary | ICD-10-CM

## 2019-10-18 PROCEDURE — 99214 OFFICE O/P EST MOD 30 MIN: CPT | Performed by: FAMILY MEDICINE

## 2019-10-18 RX ORDER — CYCLOBENZAPRINE HCL 10 MG
10 TABLET ORAL 3 TIMES DAILY
Qty: 30 TABLET | Refills: 1 | Status: SHIPPED | OUTPATIENT
Start: 2019-10-18 | End: 2019-11-07

## 2019-10-18 RX ORDER — FEXOFENADINE HCL 180 MG/1
180 TABLET ORAL DAILY
COMMUNITY

## 2019-10-18 NOTE — PROGRESS NOTES
[facility] [place]  SLEEP PROGRESS NOTE        HPI:   This is a 62year old male coming in for Patient presents with:  Obstructive Sleep Apnea (NAKUL)      HPI:  Having a lot of back and hip pian .   Had a cortisone shot in his shoulder and that ha -   TOT Sleep TM (min) - -       No results found for: IRON, IRONTOT, TIBC, IRONSAT, TRANSFERRIN, TIBCP, IRONBIND, SAT, SATUR  Lab Results   Component Value Date    SHAR 121.1 09/01/2018     Lab Results   Component Value Date    VITD 39.0 09/01/2018     Lab Take 1 tablet (10 mg total) by mouth 3 (three) times daily for 20 days. , Disp: 30 tablet, Rfl: 1  FLOVENT  MCG/ACT Inhalation Aerosol, INHALE 1 PUFF BY MOUTH TWICE DAILY, Disp: 12 g, Rfl: 0  DIAZEPAM 5 MG Oral Tab, TAKE 1 TABLET BY MOUTH EVERY 8 AUDRA Rfl:   tacrolimus 1 MG Oral Cap, Take 3 tablets (3 mg) by mouth in the morning and 2 tablets (2 mg) at night , Disp: , Rfl:        Counseling given: Not Answered         Problem List:  Patient Active Problem List:     Anxiety state     Anemia     Pancreas External ear normal.   Left Ear: External ear normal.   Nose: Nose normal.   Mouth/Throat: Oropharynx is clear and moist.   MAL 4 Tonsils 0. Eyes: Pupils are equal, round, and reactive to light.  Conjunctivae and EOM are normal.   Neck: Normal range Filters and seals shall be changed every 1 month,  Hoses every 3 months,   CPAP mask and humidifier  chamber changed every 6 month  with the Durable medical equipment provider.          Meds & Refills for this Visit:  Requested Prescriptions     Signed Pres

## 2019-10-31 ENCOUNTER — TELEPHONE (OUTPATIENT)
Dept: FAMILY MEDICINE CLINIC | Facility: CLINIC | Age: 54
End: 2019-10-31

## 2019-10-31 NOTE — TELEPHONE ENCOUNTER
----- Message from Abril Bergeron sent at 10/31/2019  4:29 PM CDT -----  Return call.    [phone]

## 2019-10-31 NOTE — TELEPHONE ENCOUNTER
----- Message from Coleen Jacinto sent at 10/31/2019  3:11 PM CDT -----  Ryc- is leaving soon for PT

## 2019-10-31 NOTE — TELEPHONE ENCOUNTER
Received Echo results from [facility] and reviewed by Dr. Charles Osborn. Per Dr. Graig Seip notation on results:    \"EF looks good. Mild valve regurg. Forward to Cardiology and transplant team.\"    LM for patient to return call.

## 2019-11-01 NOTE — TELEPHONE ENCOUNTER
Informed pt of his echo results. Pt does not have a cardiologist, who do you recommend? Please advise.

## 2019-11-01 NOTE — TELEPHONE ENCOUNTER
Informed pt to f/u with Dr. Carline Reyna.     Future Appointments   Date Time Provider Yvonne Nae   11/8/2019  2:40 PM Ney Urias MD [facility]   4/25/2020  8:00 AM Rosie Phillips MD [facility]

## 2019-11-08 ENCOUNTER — OFFICE VISIT (OUTPATIENT)
Dept: FAMILY MEDICINE CLINIC | Facility: CLINIC | Age: 54
End: 2019-11-08
Payer: COMMERCIAL

## 2019-11-08 VITALS
TEMPERATURE: 98 F | BODY MASS INDEX: 34.29 KG/M2 | SYSTOLIC BLOOD PRESSURE: 138 MMHG | DIASTOLIC BLOOD PRESSURE: 72 MMHG | OXYGEN SATURATION: 98 % | WEIGHT: 250.38 LBS | RESPIRATION RATE: 18 BRPM | HEIGHT: 71.5 IN | HEART RATE: 88 BPM

## 2019-11-08 DIAGNOSIS — Z94.0 STATUS POST KIDNEY TRANSPLANT: ICD-10-CM

## 2019-11-08 DIAGNOSIS — I07.1 MILD TRICUSPID REGURGITATION: ICD-10-CM

## 2019-11-08 DIAGNOSIS — Z23 NEEDS FLU SHOT: ICD-10-CM

## 2019-11-08 DIAGNOSIS — Z23 NEED FOR VACCINATION: ICD-10-CM

## 2019-11-08 DIAGNOSIS — I10 ESSENTIAL HYPERTENSION: Primary | ICD-10-CM

## 2019-11-08 PROCEDURE — 90686 IIV4 VACC NO PRSV 0.5 ML IM: CPT | Performed by: FAMILY MEDICINE

## 2019-11-08 PROCEDURE — 90472 IMMUNIZATION ADMIN EACH ADD: CPT | Performed by: FAMILY MEDICINE

## 2019-11-08 PROCEDURE — 90471 IMMUNIZATION ADMIN: CPT | Performed by: FAMILY MEDICINE

## 2019-11-08 PROCEDURE — 90732 PPSV23 VACC 2 YRS+ SUBQ/IM: CPT | Performed by: FAMILY MEDICINE

## 2019-11-08 PROCEDURE — 99213 OFFICE O/P EST LOW 20 MIN: CPT | Performed by: FAMILY MEDICINE

## 2019-11-08 RX ORDER — CYCLOBENZAPRINE HCL 10 MG
TABLET ORAL
Refills: 0 | COMMUNITY
Start: 2019-11-07

## 2019-11-08 NOTE — PATIENT INSTRUCTIONS
Blood pressure stable. Echo shows trivial regurgitation. Likely fistula. Return to clinic if any edema, shortness of breath, dizziness, fatigue. Continue current medications  Flu shot and pneumonia vaccine today. Return to clinic if any concern.

## 2019-11-08 NOTE — PROGRESS NOTES
[address]  PROGRESS NOTE  Chief Complaint:   Patient presents with:   Follow - Up: needs note for work stating he was here today at this time      HPI:   This is a 62year old male with history of kidney transplant and pancreas  Presents (likely too young). Social History Narrative      Single, works at [employer]    Family History:  Family History   Problem Relation Age of Onset   • Cancer Mother    • Cancer Sister    • Hypertension Brother      Allergies:     Toradol [Ketorolac * (FREESTYLE TEST) In Vitro Strip      • hydrocortisone (ANUSOL-HC) 2.5 % Rectal Cream      • Multiple Vitamins-Minerals (MULTIVITAMIN ADULT) Oral Tab      • mycophenolate sodium 360 MG Oral Tab  mg 2 (two) times daily before meals.        • Polyethylen PERRLA, EOMI. NECK: Supple, no carotid bruit, no JVD, no thyromegaly. LUNGS: Clear to auscultation bilterally, no rales/rhonchi/wheezing. HEART:  Regular rate and rhythm, S1 and S2 are normal, no murmurs, rubs or gallops.   EXTREMITIES: No edema, no cyan the treatments as a result of today.      Problem List:  Patient Active Problem List:     Anxiety state     Anemia     [facility]     Pure hypercholesterolemia     Essential hypertension     Insomnia     Status post kidney transpl

## 2019-11-14 ENCOUNTER — OFFICE VISIT (OUTPATIENT)
Dept: FAMILY MEDICINE CLINIC | Facility: CLINIC | Age: 54
End: 2019-11-14
Payer: COMMERCIAL

## 2019-11-14 VITALS
TEMPERATURE: 98 F | HEIGHT: 71.5 IN | DIASTOLIC BLOOD PRESSURE: 70 MMHG | WEIGHT: 249 LBS | RESPIRATION RATE: 20 BRPM | HEART RATE: 100 BPM | SYSTOLIC BLOOD PRESSURE: 160 MMHG | BODY MASS INDEX: 34.1 KG/M2 | OXYGEN SATURATION: 97 %

## 2019-11-14 DIAGNOSIS — R05.9 COUGH: ICD-10-CM

## 2019-11-14 DIAGNOSIS — H92.01 RIGHT EAR PAIN: ICD-10-CM

## 2019-11-14 DIAGNOSIS — J02.9 SORE THROAT: Primary | ICD-10-CM

## 2019-11-14 PROCEDURE — 87081 CULTURE SCREEN ONLY: CPT | Performed by: FAMILY MEDICINE

## 2019-11-14 PROCEDURE — 87880 STREP A ASSAY W/OPTIC: CPT | Performed by: FAMILY MEDICINE

## 2019-11-14 PROCEDURE — 99213 OFFICE O/P EST LOW 20 MIN: CPT | Performed by: FAMILY MEDICINE

## 2019-11-14 NOTE — PATIENT INSTRUCTIONS
Likely viral infection. Adequate rest, hydration, salt water gargle and tylenol as needed   Return to clinic in 1-2 weeks if no improvement. Sooner if symptoms gets worse.

## 2019-11-14 NOTE — PROGRESS NOTES
[facility] [place]  PROGRESS NOTE  Chief Complaint:   Patient presents with:  Ear Pain  Cough  Sore Throat      HPI:   This is a 62year old male presents to clinic with complaint of presents to clinic complaining of sore throat, cough and pain Medications   Medication Sig Dispense Refill   • Cyclobenzaprine HCl 10 MG Oral Tab   0   • Fexofenadine HCl 180 MG Oral Tab Take 180 mg by mouth daily.      • FLOVENT  MCG/ACT Inhalation Aerosol INHALE 1 PUFF BY MOUTH TWICE DAILY 12 g 0   • DIAZEPAM SYSTEMS:   CONSTITUTIONAL:  Denies unusual weight gain/loss, fever, chills, or fatigue. HEENT: See HPI  INTEGUMENTARY:  Denies rashes, itching, skin lesion, or excessive skin dryness.   CARDIOVASCULAR:  Denies chest pain, chest pressure, chest discomfort, cyanosis, no clubbing, FROM, 2+ dorsalis pedis pulses bilaterally. ABDOMEN:  Soft, nondistended, nontender, bowel sounds normal in all 4 quadrants, no masses, no hepatosplenomegaly.   MUSCULOSKELETAL: normal ROM, No joint pain, or muscle weakness in all ex

## 2019-11-16 ENCOUNTER — TELEPHONE (OUTPATIENT)
Dept: FAMILY MEDICINE CLINIC | Facility: CLINIC | Age: 54
End: 2019-11-16

## 2019-11-16 NOTE — TELEPHONE ENCOUNTER
----- Message from Cristino Balbuena MD sent at 11/16/2019  9:19 AM CST -----  Please inform patient that strep culture is negative.   Advised to return to clinic if no improvement in symptoms

## 2019-11-27 RX ORDER — METOPROLOL TARTRATE 50 MG/1
TABLET, FILM COATED ORAL
Qty: 180 TABLET | Refills: 0 | Status: SHIPPED | OUTPATIENT
Start: 2019-11-27 | End: 2020-02-13

## 2019-11-27 RX ORDER — ZOLPIDEM TARTRATE 10 MG/1
TABLET ORAL
Qty: 90 TABLET | Refills: 0 | Status: SHIPPED | OUTPATIENT
Start: 2019-11-27 | End: 2020-03-10

## 2019-11-27 RX ORDER — ATORVASTATIN CALCIUM 10 MG/1
TABLET, FILM COATED ORAL
Qty: 90 TABLET | Refills: 0 | Status: SHIPPED | OUTPATIENT
Start: 2019-11-27 | End: 2020-03-10

## 2019-11-27 NOTE — TELEPHONE ENCOUNTER
Future Appointments   Date Time Provider Yvonne Nae   4/25/2020  8:00 AM Lydia Shetty MD [facility] [place]     No follow up noted

## 2020-01-06 RX ORDER — CYCLOBENZAPRINE HCL 10 MG
10 TABLET ORAL
Refills: 0 | Status: CANCELLED | OUTPATIENT
Start: 2020-01-06

## 2020-01-06 NOTE — TELEPHONE ENCOUNTER
Cyclobenzaprine 10 mg last refill - Historical    LOV - 10/18/19 Sleep FU  NOV - 4/25/20 Sleep FU    Future appt:     Your appointments     Date & Time Appointment Department [facility])    Apr 25, 2020  8:00 AM CDT Sleep Follow Up with MD Makeda Zaidi

## 2020-01-06 NOTE — TELEPHONE ENCOUNTER
Would not recommend long term use   Patient should follow up with ortho and primary care to relieve pain syndrome.

## 2020-02-07 RX ORDER — DIAZEPAM 5 MG/1
TABLET ORAL
Qty: 30 TABLET | Refills: 0 | Status: SHIPPED | OUTPATIENT
Start: 2020-02-07 | End: 2020-03-09

## 2020-02-07 NOTE — TELEPHONE ENCOUNTER
Future appt:     Your appointments     Date & Time Appointment Department [facility])    Apr 25, 2020  8:00 AM CDT Sleep Follow Up with Nixon Mendoza MD 25 [facility] [place]  ([place])            [facility]

## 2020-02-12 ENCOUNTER — TELEPHONE (OUTPATIENT)
Dept: FAMILY MEDICINE CLINIC | Facility: CLINIC | Age: 55
End: 2020-02-12

## 2020-02-13 RX ORDER — METOPROLOL TARTRATE 50 MG/1
TABLET, FILM COATED ORAL
Qty: 180 TABLET | Refills: 0 | Status: SHIPPED | OUTPATIENT
Start: 2020-02-13 | End: 2020-04-30

## 2020-02-13 NOTE — TELEPHONE ENCOUNTER
Future appt:     Your appointments     Date & Time Appointment Department [facility])    Feb 15, 2020 11:00 AM CST Exam - Established with Luis Mensah MD 25 [facility] ([facility])        Apr 27, 2020  2:20

## 2020-02-13 NOTE — TELEPHONE ENCOUNTER
Informed patient that yes we did receive referral and Dr Clark Krishna will need to see him to discuss. Patient agreed and set up appt.    Future Appointments   Date Time Provider Yvonne Soni   2/15/2020 11:00 AM Elena Caldwell MD [facility]

## 2020-02-15 ENCOUNTER — OFFICE VISIT (OUTPATIENT)
Dept: FAMILY MEDICINE CLINIC | Facility: CLINIC | Age: 55
End: 2020-02-15
Payer: COMMERCIAL

## 2020-02-15 VITALS
WEIGHT: 248 LBS | SYSTOLIC BLOOD PRESSURE: 132 MMHG | HEART RATE: 77 BPM | BODY MASS INDEX: 34 KG/M2 | RESPIRATION RATE: 18 BRPM | TEMPERATURE: 97 F | OXYGEN SATURATION: 95 % | DIASTOLIC BLOOD PRESSURE: 64 MMHG

## 2020-02-15 DIAGNOSIS — R31.9 HEMATURIA, UNSPECIFIED TYPE: Primary | ICD-10-CM

## 2020-02-15 DIAGNOSIS — Z94.0 STATUS POST KIDNEY TRANSPLANT: ICD-10-CM

## 2020-02-15 DIAGNOSIS — G47.00 INSOMNIA, UNSPECIFIED TYPE: ICD-10-CM

## 2020-02-15 DIAGNOSIS — I10 ESSENTIAL HYPERTENSION: ICD-10-CM

## 2020-02-15 PROCEDURE — 99213 OFFICE O/P EST LOW 20 MIN: CPT | Performed by: FAMILY MEDICINE

## 2020-02-15 NOTE — PROGRESS NOTES
[facility] [place]  PROGRESS NOTE  Chief Complaint:   Patient presents with:  Referral: discuss whether it is truly needed      HPI:   This is a 62year old male status post kidney transplant presents for follow-up.   Patient was evaluated at [facility] Narrative      Single, works at [employer] History:  Family History   Problem Relation Age of Onset   • Cancer Mother    • Cancer Sister    • Hypertension Brother      Allergies:     Toradol [Ketorolac *    RENAL INSUFFICIENCY    Comment:Giovanni mycophenolate sodium 360 MG Oral Tab  mg 2 (two) times daily before meals. • Polyethylene Glycol 3350 Oral Powder as needed.        • tacrolimus 1 MG Oral Cap Take 3 tablets (3 mg) by mouth in the morning and 2 tablets (2 mg) at night      • Pan JVD, no thyromegaly. LUNGS: Clear to auscultation bilterally, no rales/rhonchi/wheezing. HEART:  Regular rate and rhythm, S1 and S2 are normal, no murmurs, rubs or gallops.   EXTREMITIES: No edema, no cyanosis, no clubbing, FROM, 2+ dorsalis pedis pulses Active Problem List:     Anxiety state     Anemia     Pancreas replaced by transplant ([facility]     Pure hypercholesterolemia     Essential hypertension     Insomnia     Status post kidney transplant     Organ or tissue replaced by transplant     Hypertension

## 2020-02-15 NOTE — PATIENT INSTRUCTIONS
Continue current medications  Blood pressure stable today. Advice low salt diet and exercise. Monitor your blood pressure. Return to clinic if systolic blood pressure more than 934 or diastolic more than 368.   Advice low carb in diet, AVOID FOOD WITH HIG

## 2020-03-07 NOTE — TELEPHONE ENCOUNTER
Refill from [facility] for Diazepam.   Dr. Keith Ruby out of the office until Tuesday. Last refill 2/7/20 # 30 tablets  Message left for patient to return call. Does he need refill now or can he wait for Dr. Elsie Daniel return on Tuesday?      Future danae

## 2020-03-09 RX ORDER — DIAZEPAM 5 MG/1
TABLET ORAL
Qty: 30 TABLET | Refills: 0 | Status: SHIPPED | OUTPATIENT
Start: 2020-03-09 | End: 2020-04-29

## 2020-03-09 NOTE — TELEPHONE ENCOUNTER
Future appt:     Your appointments     Date & Time Appointment Department [facility])    Apr 27, 2020  2:20 PM CDT Sleep Follow Up with Jacey Thornton MD 25 [facility], [facility] ([facility])        Jun 20, 2020 10:00 AM

## 2020-03-10 RX ORDER — ZOLPIDEM TARTRATE 10 MG/1
TABLET ORAL
Qty: 90 TABLET | Refills: 0 | Status: SHIPPED | OUTPATIENT
Start: 2020-03-10 | End: 2020-05-27

## 2020-03-10 RX ORDER — ATORVASTATIN CALCIUM 10 MG/1
TABLET, FILM COATED ORAL
Qty: 90 TABLET | Refills: 0 | Status: SHIPPED | OUTPATIENT
Start: 2020-03-10 | End: 2020-06-16

## 2020-04-13 RX ORDER — DEXAMETHASONE 4 MG/1
TABLET ORAL
Qty: 12 G | Refills: 0 | Status: SHIPPED | OUTPATIENT
Start: 2020-04-13 | End: 2020-05-27

## 2020-04-13 NOTE — TELEPHONE ENCOUNTER
Future appt:     Your appointments     Date & Time Appointment Department [facility])    Apr 27, 2020  2:20 PM CDT Sleep Follow Up with Meaghan Fragoso MD 25 [facility] ([facility])        Jun 20, 2020 10:00 AM

## 2020-04-29 ENCOUNTER — VIRTUAL PHONE E/M (OUTPATIENT)
Dept: FAMILY MEDICINE CLINIC | Facility: CLINIC | Age: 55
End: 2020-04-29
Payer: COMMERCIAL

## 2020-04-29 DIAGNOSIS — G25.81 RLS (RESTLESS LEGS SYNDROME): Chronic | ICD-10-CM

## 2020-04-29 DIAGNOSIS — G47.33 OSA (OBSTRUCTIVE SLEEP APNEA): Primary | Chronic | ICD-10-CM

## 2020-04-29 DIAGNOSIS — G47.61 PLMD (PERIODIC LIMB MOVEMENT DISORDER): Chronic | ICD-10-CM

## 2020-04-29 PROCEDURE — 99214 OFFICE O/P EST MOD 30 MIN: CPT | Performed by: FAMILY MEDICINE

## 2020-04-29 RX ORDER — DIAZEPAM 5 MG/1
TABLET ORAL
Qty: 30 TABLET | Refills: 0 | Status: SHIPPED | OUTPATIENT
Start: 2020-04-29 | End: 2020-05-27

## 2020-04-29 NOTE — PROGRESS NOTES
[facility]  [facility]        HPI:   This is a 60year old male coming in for No chief complaint on file. HPI: Patient has been using his cpap .  He was laid off from his primary job at [employer] and moved to third sh Past Medical History:   Diagnosis Date   • ANEMIA    • BACK PAIN    • DIABETES     Dx at age 20   • Diabetes [facility])    • Essential hypertension    • HYPERLIPIDEMIA    • Hyperlipidemia    • HYPERTENSION      Past Surgical History:   Procedure Lateral FOR AMBIEN 90 tablet 0   • Metoprolol Tartrate 50 MG Oral Tab TAKE 1 TABLET BY MOUTH 2 TIMES DAILY. 180 tablet 0   • Cyclobenzaprine HCl 10 MG Oral Tab   0   • Fexofenadine HCl 180 MG Oral Tab Take 180 mg by mouth daily.      • Fluticasone Propionate 50 MCG disorder)     RLS (restless legs syndrome)     Multinodular thyroid     Low back pain     Hx of sciatica      REVIEW OF SYSTEMS:   Review of Systems   Constitutional: Negative. Negative for fatigue. HENT: Negative. Eyes: Negative.     Respiratory: Pos night most nights of the week. Recommend weight loss, and maintain and optimal BMI with Exercise 30 minutes most days of the week to target heart rate . Advised patient to change filters,masks,hoses  and tubes and equiptment on a  regular schedule.   Salvadore Skiff

## 2020-04-29 NOTE — TELEPHONE ENCOUNTER
Future appt:     Your appointments     Date & Time Appointment Department [facility])    Apr 29, 2020 10:10 AM CDT Virtual Phone Visit with Arely Jensen MD 25 [facility] [place] ([facility])    You have been schedule

## 2020-04-30 RX ORDER — METOPROLOL TARTRATE 50 MG/1
TABLET, FILM COATED ORAL
Qty: 180 TABLET | Refills: 0 | Status: SHIPPED | OUTPATIENT
Start: 2020-04-30 | End: 2020-07-07

## 2020-04-30 NOTE — TELEPHONE ENCOUNTER
Future appt:     Your appointments     Date & Time Appointment Department [facility])    Jun 20, 2020 10:00 AM CDT Medicare Annual Well Visit with Adi Coto MD 25 [facility], [facility] ([facility])        Aug 18, 202

## 2020-05-27 RX ORDER — DEXAMETHASONE 4 MG/1
TABLET ORAL
Qty: 12 G | Refills: 0 | Status: SHIPPED | OUTPATIENT
Start: 2020-05-27

## 2020-05-27 RX ORDER — DIAZEPAM 5 MG/1
TABLET ORAL
Qty: 30 TABLET | Refills: 0 | Status: SHIPPED | OUTPATIENT
Start: 2020-05-27 | End: 2020-07-01

## 2020-05-27 RX ORDER — ZOLPIDEM TARTRATE 10 MG/1
TABLET ORAL
Qty: 90 TABLET | Refills: 0 | Status: SHIPPED | OUTPATIENT
Start: 2020-05-27 | End: 2020-09-12

## 2020-05-27 NOTE — TELEPHONE ENCOUNTER
Future appt:     Your appointments     Date & Time Appointment Department [facility])    Jun 20, 2020 10:00 AM CDT Medicare Annual Well Visit with Arabella Chavez MD 25 [facility], [facility] ([facility])        Aug 18, 202

## 2020-05-27 NOTE — TELEPHONE ENCOUNTER
Future appt:     Your appointments     Date & Time Appointment Department [facility])    Jun 20, 2020 10:00 AM CDT Medicare Annual Well Visit with Jatinder Carlos MD 25 [facility], [facility] ([facility])        Aug 18, 202

## 2020-06-15 NOTE — TELEPHONE ENCOUNTER
Future appt:     Your appointments     Date & Time Appointment Department [facility])    Jun 20, 2020 10:00 AM CDT Medicare Annual Well Visit with Karly Huff MD [address] [place], [place] [address]way, Darryl Maffucci ([facility])        Aug 18, 202

## 2020-06-16 RX ORDER — ATORVASTATIN CALCIUM 10 MG/1
TABLET, FILM COATED ORAL
Qty: 90 TABLET | Refills: 0 | Status: SHIPPED | OUTPATIENT
Start: 2020-06-16 | End: 2020-09-12

## 2020-06-20 ENCOUNTER — OFFICE VISIT (OUTPATIENT)
Dept: FAMILY MEDICINE CLINIC | Facility: CLINIC | Age: 55
End: 2020-06-20
Payer: COMMERCIAL

## 2020-06-20 VITALS
SYSTOLIC BLOOD PRESSURE: 130 MMHG | WEIGHT: 254 LBS | RESPIRATION RATE: 20 BRPM | HEIGHT: 71.5 IN | OXYGEN SATURATION: 97 % | HEART RATE: 66 BPM | TEMPERATURE: 97 F | DIASTOLIC BLOOD PRESSURE: 58 MMHG | BODY MASS INDEX: 34.78 KG/M2

## 2020-06-20 DIAGNOSIS — E04.2 MULTINODULAR THYROID: ICD-10-CM

## 2020-06-20 DIAGNOSIS — Z13.31 DEPRESSION SCREENING: ICD-10-CM

## 2020-06-20 DIAGNOSIS — I10 ESSENTIAL HYPERTENSION: ICD-10-CM

## 2020-06-20 DIAGNOSIS — R97.20 ELEVATED PSA: ICD-10-CM

## 2020-06-20 DIAGNOSIS — Z94.0 STATUS POST KIDNEY TRANSPLANT: ICD-10-CM

## 2020-06-20 DIAGNOSIS — Z00.00 ENCOUNTER FOR MEDICARE ANNUAL WELLNESS EXAM: Primary | ICD-10-CM

## 2020-06-20 DIAGNOSIS — Z00.00 ENCOUNTER FOR ANNUAL HEALTH EXAMINATION: ICD-10-CM

## 2020-06-20 DIAGNOSIS — J44.9 CHRONIC OBSTRUCTIVE PULMONARY DISEASE, UNSPECIFIED COPD TYPE (HCC): ICD-10-CM

## 2020-06-20 PROCEDURE — 99396 PREV VISIT EST AGE 40-64: CPT | Performed by: FAMILY MEDICINE

## 2020-06-20 RX ORDER — TAMSULOSIN HYDROCHLORIDE 0.4 MG/1
0.4 CAPSULE ORAL DAILY
COMMUNITY
Start: 2020-04-09 | End: 2021-04-09

## 2020-06-20 RX ORDER — ALBUTEROL SULFATE 90 UG/1
1-2 AEROSOL, METERED RESPIRATORY (INHALATION) EVERY 4 HOURS PRN
Qty: 8.5 G | Refills: 2 | Status: SHIPPED | OUTPATIENT
Start: 2020-06-20 | End: 2021-11-19

## 2020-06-20 NOTE — PROGRESS NOTES
HPI:   Laura Nieves is a 60year old male who presents for a Medicare Subsequent Annual Wellness visit (Pt already had Initial Annual Wellness). Patient has history of hypertension, COPD, multinodular thyroid and status post kidney transplant.   Pa 7/15/2014        Years since quittin.9      Smokeless tobacco: Never Used         CAGE Alcohol screening   Macky Apley was screened for Alcohol abuse and had a score of 0 so is at low risk.      Patient Care Team: Patient Care Team:  Anshul Haile, INHALE 1 PUFF BY MOUTH TWICE DAILY  Zolpidem Tartrate 10 MG Oral Tab, TAKE 1 TABLET BY MOUTH NIGHTLY AS NEEDED FOR SLEEP  Metoprolol Tartrate 50 MG Oral Tab, TAKE 1 TABLET BY MOUTH TWICE DAILY  Cyclobenzaprine HCl 10 MG Oral Tab,   Fexofenadine HCl 180 MG quit smoking about 5 years ago. His smoking use included cigarettes. He has never used smokeless tobacco. He reports current alcohol use. He reports that he does not use drugs.      REVIEW OF SYSTEMS:   GENERAL: feels well otherwise  SKIN: denies any Karli clubbing or edema, peripheral pulses intact  NEUROLOGIC: Cranial nerves II through XII grossly intact; reflexes normal  PSYCHIATRIC: alert and oriented x 3; affect appropriate    Vaccination History     Immunization History   Administered Date(s) Administe current health state?: (P) Good  How do you maintain positive mental well-being?: (P) Social Interaction; Visiting Friends; Visiting Family    This section provided for quick review of chart, separate sheet to patient  PREVENTATIVE SERVICES  INDICATIONS AND retarded   Persons who live in the same house as a HepB virus carrier   Homosexual men   Illicit injectable drug abusers     Tetanus Toxoid  Only covered with a cut with metal- TD and TDaP Not covered by Medicare Part B) No vaccine history found This may b

## 2020-06-20 NOTE — PATIENT INSTRUCTIONS
Continue current medications  Blood pressure stable today. Labs reviewed from hospital.   Continue with inhaler. Follow up with urologist.   Erling Postin stable with inhalers. Advice low salt diet and exercise. Monitor your blood pressure.  Return to clinic i between ages 73-68)  No results found for this or any previous visit.  Limited to patients who meet one of the following criteria:   • Men who are 73-68 years old and have smoked more than 100 cigarettes in their lifetime   • Anyone with a family history Hepatitis B for Moderate/High Risk No orders found for this or any previous visit.  Medium/high risk factors:   End-stage renal disease   Hemophiliacs who received Factor VIII or IX concentrates   Clients of institutions for the mentally retarded   Persons

## 2020-07-01 RX ORDER — DIAZEPAM 5 MG/1
TABLET ORAL
Qty: 30 TABLET | Refills: 0 | Status: SHIPPED | OUTPATIENT
Start: 2020-07-01 | End: 2020-09-02

## 2020-07-01 NOTE — TELEPHONE ENCOUNTER
Future appt:     Your appointments     Date & Time Appointment Department [facility])    Aug 18, 2020  3:00 PM CDT Sleep Follow Up with Mariah Ramos MD 25 [facility], [facility] ([facility])            [employer]

## 2020-07-07 RX ORDER — METOPROLOL TARTRATE 50 MG/1
TABLET, FILM COATED ORAL
Qty: 180 TABLET | Refills: 0 | Status: SHIPPED | OUTPATIENT
Start: 2020-07-07 | End: 2020-09-24

## 2020-07-07 NOTE — TELEPHONE ENCOUNTER
Please advise refill of Metoprolol 50mg. Last Rx: 4/30/20      Future appt:     Your appointments     Date & Time Appointment Department [facility])    Aug 18, 2020  3:00 PM CDT Sleep Follow Up with Ana Laura Ford MD [address] [place] [address]

## 2020-08-18 ENCOUNTER — OFFICE VISIT (OUTPATIENT)
Dept: FAMILY MEDICINE CLINIC | Facility: CLINIC | Age: 55
End: 2020-08-18
Payer: COMMERCIAL

## 2020-08-18 VITALS
HEART RATE: 85 BPM | RESPIRATION RATE: 18 BRPM | SYSTOLIC BLOOD PRESSURE: 148 MMHG | OXYGEN SATURATION: 97 % | HEIGHT: 71.5 IN | TEMPERATURE: 98 F | WEIGHT: 257.38 LBS | BODY MASS INDEX: 35.25 KG/M2 | DIASTOLIC BLOOD PRESSURE: 68 MMHG

## 2020-08-18 DIAGNOSIS — G47.33 OSA (OBSTRUCTIVE SLEEP APNEA): Primary | ICD-10-CM

## 2020-08-18 PROCEDURE — 3008F BODY MASS INDEX DOCD: CPT | Performed by: FAMILY MEDICINE

## 2020-08-18 PROCEDURE — 3077F SYST BP >= 140 MM HG: CPT | Performed by: FAMILY MEDICINE

## 2020-08-18 PROCEDURE — 3078F DIAST BP <80 MM HG: CPT | Performed by: FAMILY MEDICINE

## 2020-08-18 PROCEDURE — 99214 OFFICE O/P EST MOD 30 MIN: CPT | Performed by: FAMILY MEDICINE

## 2020-08-18 NOTE — PROGRESS NOTES
[facility]        HPI:   This is a 60year old male coming in for Patient presents with:   Follow - Up: sleep/3 months      HPI:  He notes that he is having some difficulty with covid and some difficulty with working Value Date    B12 981 09/01/2018         Past Medical History:   Diagnosis Date   • ANEMIA    • BACK PAIN    • DIABETES     Dx at age 20   • Diabetes [facility])    • Essential hypertension    • HYPERLIPIDEMIA    • Hyperlipidemia    • HYPERTENSION      Past Surg the lungs 2 (two) times daily. 1 Inhaler 2   • Albuterol Sulfate HFA (PROAIR HFA) 108 (90 Base) MCG/ACT Inhalation Aero Soln Inhale 1-2 puffs into the lungs every 4 (four) hours as needed.  8.5 g 2   • ATORVASTATIN 10 MG Oral Tab TAKE 1 TABLET BY MOUTH LEEANNE transplant     Hypertension     NAKUL (obstructive sleep apnea)     PLMD (periodic limb movement disorder)     RLS (restless legs syndrome)     Multinodular thyroid     Low back pain     Hx of sciatica     Elevated PSA     Chronic obstructive pulmonary disea Cardiovascular: Normal rate, regular rhythm and intact distal pulses. Murmur heard. Pulmonary/Chest: Effort normal and breath sounds normal. No stridor. Abdominal: Soft.  Bowel sounds are normal.   Musculoskeletal:      Comments: Gait normal   Lymph complications, allergies, or worsening or changing symptoms. Parent is to call with any side effects or complications from the treatments as a result of today.      \" This note was created utilizing Dragon speech recognition software.  Please excuse any g

## 2020-09-02 RX ORDER — DIAZEPAM 5 MG/1
TABLET ORAL
Qty: 30 TABLET | Refills: 0 | Status: SHIPPED | OUTPATIENT
Start: 2020-09-02 | End: 2020-10-12

## 2020-09-02 NOTE — TELEPHONE ENCOUNTER
Future appt:     Your appointments     Date & Time Appointment Department [facility])    Feb 18, 2021  3:20 PM CST Sleep Follow Up with Daniela Byrd MD 25 [facility], [facility] ([place]) [facility]

## 2020-09-12 RX ORDER — ATORVASTATIN CALCIUM 10 MG/1
TABLET, FILM COATED ORAL
Qty: 90 TABLET | Refills: 0 | Status: SHIPPED | OUTPATIENT
Start: 2020-09-12 | End: 2020-09-14

## 2020-09-12 RX ORDER — ZOLPIDEM TARTRATE 10 MG/1
TABLET ORAL
Qty: 90 TABLET | Refills: 0 | Status: SHIPPED | OUTPATIENT
Start: 2020-09-12 | End: 2020-12-21

## 2020-09-12 NOTE — TELEPHONE ENCOUNTER
Future appt:     Your appointments     Date & Time Appointment Department [facility])    Feb 18, 2021  3:20 PM CST Sleep Follow Up with Mariah Ramos MD 25 [facility], [facility] ([place]) [facility]

## 2020-09-14 RX ORDER — ATORVASTATIN CALCIUM 10 MG/1
10 TABLET, FILM COATED ORAL NIGHTLY
Qty: 90 TABLET | Refills: 1 | Status: SHIPPED | OUTPATIENT
Start: 2020-09-14 | End: 2020-12-21

## 2020-09-14 NOTE — TELEPHONE ENCOUNTER
Future Appointments   Date Time Provider Yvonne Nae   2/18/2021  3:20 PM Tiara Ayers MD [facility] [place] [facility] [place]      Return in 6 months (on 12/20/2020) for follow up.

## 2020-09-15 ENCOUNTER — TELEPHONE (OUTPATIENT)
Dept: FAMILY MEDICINE CLINIC | Facility: CLINIC | Age: 55
End: 2020-09-15

## 2020-09-15 NOTE — TELEPHONE ENCOUNTER
RF Lipitor 10mg   #90 w/ 3 RFS  ( per insurance co this is a covered RX / benefit )           Future Appointments   Date Time Provider Yvonne Soni   2/18/2021  3:20 PM Julia Cade MD [facility] [place] [facility] [place]       otw

## 2020-09-24 RX ORDER — METOPROLOL TARTRATE 50 MG/1
TABLET, FILM COATED ORAL
Qty: 180 TABLET | Refills: 0 | Status: SHIPPED | OUTPATIENT
Start: 2020-09-24 | End: 2020-12-21

## 2020-09-24 NOTE — TELEPHONE ENCOUNTER
Future appt:     Your appointments     Date & Time Appointment Department [facility])    Feb 18, 2021  3:20 PM CST Sleep Follow Up with Opal Avendaño MD 25 [facility], [facility] ([facility])            [employer]

## 2020-09-25 ENCOUNTER — TELEPHONE (OUTPATIENT)
Dept: FAMILY MEDICINE CLINIC | Facility: CLINIC | Age: 55
End: 2020-09-25

## 2020-10-12 RX ORDER — DIAZEPAM 5 MG/1
TABLET ORAL
Qty: 30 TABLET | Refills: 0 | Status: SHIPPED | OUTPATIENT
Start: 2020-10-12 | End: 2020-11-27

## 2020-10-12 NOTE — TELEPHONE ENCOUNTER
Future appt:     Your appointments     Date & Time Appointment Department [facility])    Feb 18, 2021  3:20 PM CST Sleep Follow Up with Nik Clark MD [address] [facility] [address], [address] ([place]) [facility]

## 2020-10-16 ENCOUNTER — VIRTUAL PHONE E/M (OUTPATIENT)
Dept: FAMILY MEDICINE CLINIC | Facility: CLINIC | Age: 55
End: 2020-10-16
Payer: COMMERCIAL

## 2020-10-16 VITALS
HEART RATE: 85 BPM | BODY MASS INDEX: 35.98 KG/M2 | HEIGHT: 71 IN | WEIGHT: 257 LBS | SYSTOLIC BLOOD PRESSURE: 148 MMHG | TEMPERATURE: 97 F | DIASTOLIC BLOOD PRESSURE: 68 MMHG

## 2020-10-16 DIAGNOSIS — J06.9 UPPER RESPIRATORY TRACT INFECTION, UNSPECIFIED TYPE: Primary | ICD-10-CM

## 2020-10-16 DIAGNOSIS — Z94.0 STATUS POST KIDNEY TRANSPLANT: ICD-10-CM

## 2020-10-16 PROCEDURE — 3078F DIAST BP <80 MM HG: CPT | Performed by: FAMILY MEDICINE

## 2020-10-16 PROCEDURE — 3008F BODY MASS INDEX DOCD: CPT | Performed by: FAMILY MEDICINE

## 2020-10-16 PROCEDURE — 3077F SYST BP >= 140 MM HG: CPT | Performed by: FAMILY MEDICINE

## 2020-10-16 PROCEDURE — G2012 BRIEF CHECK IN BY MD/QHP: HCPCS | Performed by: FAMILY MEDICINE

## 2020-10-16 NOTE — PROGRESS NOTES
Chief Complaint:   Patient presents with:  Sick Call    Virtual visit done in [facility] of corona virus pandemic    Consent  For phone visit  Done   HPI:   This is a 60year old male with acute illness with cough and  Sore throat. Symptoms for 2 -3  Days.  Took tablet 0   • METOPROLOL TARTRATE 50 MG Oral Tab TAKE 1 TABLET BY MOUTH TWICE DAILY 180 tablet 0   • atorvastatin 10 MG Oral Tab Take 1 tablet (10 mg total) by mouth nightly.  90 tablet 1   • ZOLPIDEM TARTRATE 10 MG Oral Tab TAKE 1 TABLET BY MOUTH NIGHTLY AS RENAL INSUFFICIENCY    Comment:Renal transplant  Amlodipine                  Comment:Other reaction(s): ankle swelling  Ace Inhibitors          Coughing       REVIEW OF SYSTEMS:   CONSTITUTIONAL:  Denies , fever, chills,  +  fatigue,    EENT:  Eyes:  [place] Patient/Caregiver Education: There are no barriers to learning. Medical education done. Outcome: Patient verbalizes understanding. Patient is notified to call with any questions, complications, allergies, or worsening or changing symptoms.   Patient is to

## 2020-11-27 RX ORDER — DIAZEPAM 5 MG/1
TABLET ORAL
Qty: 30 TABLET | Refills: 0 | Status: SHIPPED | OUTPATIENT
Start: 2020-11-27 | End: 2020-12-29

## 2020-11-27 NOTE — TELEPHONE ENCOUNTER
Diazepam:  10/12/20    Future appt:     Your appointments     Date & Time Appointment Department [facility])    Feb 18, 2021  3:20 PM CST Sleep Follow Up with Daniela Byrd MD [address] [place] [address] [place] ([place])

## 2020-12-15 NOTE — TELEPHONE ENCOUNTER
Future appt:     Your appointments     Date & Time Appointment Department [facility])    Feb 18, 2021  3:20 PM CST Sleep Follow Up with Ana Laura Ford MD 25 [facility], [facility] ([place]) [facility]

## 2020-12-21 RX ORDER — METOPROLOL TARTRATE 50 MG/1
TABLET, FILM COATED ORAL
Qty: 180 TABLET | Refills: 0 | Status: SHIPPED | OUTPATIENT
Start: 2020-12-21 | End: 2021-02-18

## 2020-12-21 RX ORDER — ZOLPIDEM TARTRATE 10 MG/1
TABLET ORAL
Qty: 90 TABLET | Refills: 0 | Status: SHIPPED | OUTPATIENT
Start: 2020-12-21 | End: 2021-03-02

## 2020-12-21 RX ORDER — ATORVASTATIN CALCIUM 10 MG/1
TABLET, FILM COATED ORAL
Qty: 90 TABLET | Refills: 1 | Status: SHIPPED | OUTPATIENT
Start: 2020-12-21 | End: 2021-05-24

## 2020-12-23 ENCOUNTER — OFFICE VISIT (OUTPATIENT)
Dept: FAMILY MEDICINE CLINIC | Facility: CLINIC | Age: 55
End: 2020-12-23
Payer: COMMERCIAL

## 2020-12-23 VITALS
DIASTOLIC BLOOD PRESSURE: 70 MMHG | OXYGEN SATURATION: 96 % | RESPIRATION RATE: 16 BRPM | HEART RATE: 86 BPM | BODY MASS INDEX: 35.33 KG/M2 | WEIGHT: 258 LBS | HEIGHT: 71.5 IN | TEMPERATURE: 99 F | SYSTOLIC BLOOD PRESSURE: 130 MMHG

## 2020-12-23 DIAGNOSIS — R73.9 HYPERGLYCEMIA: ICD-10-CM

## 2020-12-23 DIAGNOSIS — J41.0 SIMPLE CHRONIC BRONCHITIS (HCC): ICD-10-CM

## 2020-12-23 DIAGNOSIS — G47.00 INSOMNIA, UNSPECIFIED TYPE: ICD-10-CM

## 2020-12-23 DIAGNOSIS — I10 ESSENTIAL HYPERTENSION: Primary | ICD-10-CM

## 2020-12-23 PROCEDURE — 99214 OFFICE O/P EST MOD 30 MIN: CPT | Performed by: FAMILY MEDICINE

## 2020-12-23 PROCEDURE — 3078F DIAST BP <80 MM HG: CPT | Performed by: FAMILY MEDICINE

## 2020-12-23 PROCEDURE — 3008F BODY MASS INDEX DOCD: CPT | Performed by: FAMILY MEDICINE

## 2020-12-23 PROCEDURE — 3075F SYST BP GE 130 - 139MM HG: CPT | Performed by: FAMILY MEDICINE

## 2020-12-23 NOTE — PROGRESS NOTES
[address]  PROGRESS NOTE  Chief Complaint:   Patient presents with: Follow - Up: 6 months       HPI:   This is a 60year old male with hypertension, hyperglycemia, insomnia, bronchitis and s/p kidney transplant.   Currently patient has beers a month    Drug use: No    Social History    Social History Narrative      Single, works at [employer]    Family History:  Family History   Problem Relation Age of Onset   • Cancer Mother    • Cancer Sister    • Hypertension Brother      Allerg 2 (two) times daily. • predniSONE 5 MG Oral Tab Take 1 tablet by mouth daily. 1   • aspirin EC 81 MG Oral Tab EC Take 81 mg by mouth daily.        • Glucose Blood (FREESTYLE TEST) In Vitro Strip      • hydrocortisone (ANUSOL-HC) 2.5 % Rectal Cream alert, awake and oriented, well developed, well nourished, no acute distress. EYES:  Sclera anicteric, conjunctiva normal, PERRLA, EOMI. NECK: Supple, no carotid bruit, no JVD, no thyromegaly.   LUNGS: Clear to auscultation bilterally, no rales/rhonchi/w worsening or changing symptoms. Patient is to call with any side effects or complications from the treatments as a result of today.      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

## 2020-12-23 NOTE — PATIENT INSTRUCTIONS
Continue current medications  A1c in prediabetes range. Advice low carb in diet, AVOID FOOD WITH HIGH GLYCEMIC INDEX, have smaller portion meal, avoid bread, pasta and potatoes, no juice or soda, don't eat late at night, exercise,  and weight loss.      B

## 2020-12-28 NOTE — TELEPHONE ENCOUNTER
Future appt:     Your appointments     Date & Time Appointment Department [facility])    Feb 18, 2021  3:20 PM CST Sleep Follow Up with Arely Jensen MD 25 [facility], [employer] ([place]) [facility]

## 2020-12-29 RX ORDER — DIAZEPAM 5 MG/1
TABLET ORAL
Qty: 30 TABLET | Refills: 0 | Status: SHIPPED | OUTPATIENT
Start: 2020-12-29 | End: 2021-01-28

## 2021-01-28 RX ORDER — DIAZEPAM 5 MG/1
TABLET ORAL
Qty: 30 TABLET | Refills: 4 | Status: SHIPPED | OUTPATIENT
Start: 2021-01-28 | End: 2021-07-01

## 2021-01-28 NOTE — TELEPHONE ENCOUNTER
Future appt:     Your appointments     Date & Time Appointment Department [facility])    Feb 18, 2021  3:20 PM CST Sleep Follow Up with Opal Avendaño MD 25 [facility], [place]) [facility]

## 2021-02-16 DIAGNOSIS — Z79.52 LONG TERM (CURRENT) USE OF SYSTEMIC STEROIDS: Primary | ICD-10-CM

## 2021-02-18 ENCOUNTER — OFFICE VISIT (OUTPATIENT)
Dept: FAMILY MEDICINE CLINIC | Facility: CLINIC | Age: 56
End: 2021-02-18
Payer: COMMERCIAL

## 2021-02-18 VITALS
WEIGHT: 257 LBS | HEART RATE: 75 BPM | HEIGHT: 71.5 IN | SYSTOLIC BLOOD PRESSURE: 140 MMHG | RESPIRATION RATE: 16 BRPM | DIASTOLIC BLOOD PRESSURE: 72 MMHG | OXYGEN SATURATION: 98 % | BODY MASS INDEX: 35.19 KG/M2 | TEMPERATURE: 98 F

## 2021-02-18 DIAGNOSIS — G47.33 OSA (OBSTRUCTIVE SLEEP APNEA): Primary | ICD-10-CM

## 2021-02-18 PROCEDURE — 99214 OFFICE O/P EST MOD 30 MIN: CPT | Performed by: FAMILY MEDICINE

## 2021-02-18 PROCEDURE — 3078F DIAST BP <80 MM HG: CPT | Performed by: FAMILY MEDICINE

## 2021-02-18 PROCEDURE — 3077F SYST BP >= 140 MM HG: CPT | Performed by: FAMILY MEDICINE

## 2021-02-18 PROCEDURE — 3008F BODY MASS INDEX DOCD: CPT | Performed by: FAMILY MEDICINE

## 2021-02-18 RX ORDER — NIFEDIPINE 60 MG/1
TABLET, FILM COATED, EXTENDED RELEASE ORAL
COMMUNITY
Start: 2021-02-03

## 2021-02-18 RX ORDER — CALCITRIOL 0.25 UG/1
0.25 CAPSULE, LIQUID FILLED ORAL EVERY OTHER DAY
COMMUNITY
Start: 2020-12-25

## 2021-02-18 RX ORDER — METOPROLOL SUCCINATE 50 MG/1
50 TABLET, EXTENDED RELEASE ORAL
COMMUNITY
Start: 2021-02-15 | End: 2021-10-04

## 2021-02-18 NOTE — PROGRESS NOTES
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 • DIAZEPAM 5 MG Oral Tab TAKE 1 TABLET BY MOUTH EVERY 8 HOURS AS NEEDED 30 tablet 4   • ATORVASTATIN 10 MG Oral Tab TAKE 1 TABLET BY MOUTH NIGHTLY.  90 tablet 1   • Zolpidem Tartrate 10 MG Oral Tab TAKE 1 TABLET BY MOUTH NIGHTLY AS NEEDED FOR Essential hypertension     Insomnia     Status post kidney transplant     Organ or tissue replaced by transplant     Hypertension     NAKUL (obstructive sleep apnea)     PLMD (periodic limb movement disorder)     RLS (restless legs syndrome)     Multinodular Neck: Normal range of motion. Neck supple. No tracheal deviation present. No thyromegaly present. MAL 4  Tonsils 0    Cardiovascular: Normal rate, regular rhythm and intact distal pulses. Murmur heard.   Large murmur anterior chest.      Pulmonary/Alysha with any questions, complications, allergies, or worsening or changing symptoms. Parent is to call with any side effects or complications from the treatments as a result of today. \" This note was created utilizing Dragon speech recognition software.

## 2021-03-02 RX ORDER — ZOLPIDEM TARTRATE 10 MG/1
TABLET ORAL
Qty: 90 TABLET | Refills: 0 | Status: SHIPPED | OUTPATIENT
Start: 2021-03-02 | End: 2021-05-21

## 2021-03-02 NOTE — TELEPHONE ENCOUNTER
Please advise refill of Zolpidem 10mg. Last Rx: 12/21/20    Future appt:     Your appointments     Date & Time Appointment Department ([address], [address])    Mar 16, 2021  9:00 AM CDT XR DEXA BONE DENSITOMETRY with [facility] DEXA  [facility] ([place]

## 2021-03-03 DIAGNOSIS — Z23 NEED FOR VACCINATION: ICD-10-CM

## 2021-03-16 ENCOUNTER — HOSPITAL ENCOUNTER (OUTPATIENT)
Dept: BONE DENSITY | Age: 56
Discharge: HOME | End: 2021-03-16
Attending: FAMILY MEDICINE
Payer: COMMERCIAL

## 2021-03-16 ENCOUNTER — TELEPHONE (OUTPATIENT)
Dept: FAMILY MEDICINE CLINIC | Facility: CLINIC | Age: 56
End: 2021-03-16

## 2021-03-16 DIAGNOSIS — Z79.52 LONG TERM (CURRENT) USE OF SYSTEMIC STEROIDS: ICD-10-CM

## 2021-03-16 PROCEDURE — 77080 DXA BONE DENSITY AXIAL: CPT | Performed by: FAMILY MEDICINE

## 2021-03-16 RX ORDER — METOPROLOL SUCCINATE 100 MG/1
100 TABLET, EXTENDED RELEASE ORAL DAILY
COMMUNITY
Start: 2021-02-15 | End: 2021-10-04

## 2021-03-16 NOTE — TELEPHONE ENCOUNTER
Spoke to pt. Stated that Dr. Gabriel Delgado increased metoprolol to 150mg daily in February, felt was needing increase.     Dr. Mich Coello discontinued tacrolimus and started Envarsus XL 3mg daily pt stated was part of a clinical trial and this medication worked b

## 2021-03-16 NOTE — TELEPHONE ENCOUNTER
FYI  - Change on some medications patient wants Dr aware of. Taking Metoprolol 50 mg & 100mg one each @ evening. Envarsus XL Tabs. Takes 3  1mg tabs in the morning. Patient no longer on Tacrolimus.

## 2021-03-16 NOTE — TELEPHONE ENCOUNTER
----- Message from Car Medina MD sent at 3/16/2021 11:34 AM CDT -----  Please inform patient that his DEXA scan is negative for any osteopenia. Please ask patient if result is to be sent anywhere.

## 2021-03-16 NOTE — TELEPHONE ENCOUNTER
Spoke to pt. Verbalized understanding of results. Pt asked to have results sent to   Dr. Roxine Cushing and Dr. Hemal Wakefield    Results faxed.

## 2021-04-21 ENCOUNTER — HOSPITAL ENCOUNTER (OUTPATIENT)
Dept: ULTRASOUND IMAGING | Age: 56
Discharge: HOME | End: 2021-04-21
Attending: OTOLARYNGOLOGY
Payer: COMMERCIAL

## 2021-04-21 DIAGNOSIS — E04.2 NONTOXIC MULTINODULAR GOITER: ICD-10-CM

## 2021-04-21 PROCEDURE — 76536 US EXAM OF HEAD AND NECK: CPT | Performed by: OTOLARYNGOLOGY

## 2021-04-27 ENCOUNTER — TELEPHONE (OUTPATIENT)
Dept: FAMILY MEDICINE CLINIC | Facility: CLINIC | Age: 56
End: 2021-04-27

## 2021-05-21 RX ORDER — ZOLPIDEM TARTRATE 10 MG/1
TABLET ORAL
Qty: 90 TABLET | Refills: 0 | Status: SHIPPED | OUTPATIENT
Start: 2021-05-21 | End: 2021-08-23

## 2021-05-21 NOTE — TELEPHONE ENCOUNTER
Future appt:     Last Appointment with provider:   12/23/2020- advised Return in 6 months (on 12/20/2020) for follow up.   Last refill: 3/2/21- zolpidem    Future Appointments   Date Time Provider Yvonne Soni   5/26/2021 11:00 AM Elena Caldwell MD [facility]

## 2021-05-24 RX ORDER — ATORVASTATIN CALCIUM 10 MG/1
TABLET, FILM COATED ORAL
Qty: 90 TABLET | Refills: 0 | Status: SHIPPED | OUTPATIENT
Start: 2021-05-24 | End: 2021-08-21

## 2021-05-24 NOTE — TELEPHONE ENCOUNTER
Future appt:     Your appointments     Date & Time Appointment Department [facility])    May 26, 2021 11:00 AM CDT Follow Up Visit with Deb Lange MD [address], [place] ([facility]

## 2021-05-26 ENCOUNTER — OFFICE VISIT (OUTPATIENT)
Dept: FAMILY MEDICINE CLINIC | Facility: CLINIC | Age: 56
End: 2021-05-26
Payer: COMMERCIAL

## 2021-05-26 VITALS
RESPIRATION RATE: 16 BRPM | HEIGHT: 71.5 IN | HEART RATE: 66 BPM | WEIGHT: 267.38 LBS | SYSTOLIC BLOOD PRESSURE: 132 MMHG | TEMPERATURE: 98 F | BODY MASS INDEX: 36.61 KG/M2 | OXYGEN SATURATION: 97 % | DIASTOLIC BLOOD PRESSURE: 68 MMHG

## 2021-05-26 DIAGNOSIS — F41.1 ANXIETY STATE: ICD-10-CM

## 2021-05-26 DIAGNOSIS — I10 ESSENTIAL HYPERTENSION: Primary | ICD-10-CM

## 2021-05-26 DIAGNOSIS — R73.9 HYPERGLYCEMIA: ICD-10-CM

## 2021-05-26 PROCEDURE — 99213 OFFICE O/P EST LOW 20 MIN: CPT | Performed by: FAMILY MEDICINE

## 2021-05-26 PROCEDURE — 3078F DIAST BP <80 MM HG: CPT | Performed by: FAMILY MEDICINE

## 2021-05-26 PROCEDURE — 3075F SYST BP GE 130 - 139MM HG: CPT | Performed by: FAMILY MEDICINE

## 2021-05-26 PROCEDURE — 3008F BODY MASS INDEX DOCD: CPT | Performed by: FAMILY MEDICINE

## 2021-05-26 NOTE — PATIENT INSTRUCTIONS
Continue current medications  Blood pressure stable today. Advice low salt diet and exercise. Monitor your blood pressure. Return to clinic if systolic blood pressure more than 952 or diastolic more than 905. Anxiety unchanged.  Continue with medication

## 2021-05-26 NOTE — PROGRESS NOTES
[address]  PROGRESS NOTE  Chief Complaint:   Patient presents with:   Follow - Up: 6 months       HPI:   This is a 60year old male with hypertension, hyperglycemia, anxiety state and history of kidney transplant and pancreas transplant Former Smoker        Types: Cigarettes        Quit date: 7/15/2014        Years since quittin.8      Smokeless tobacco: Never Used    Vaping Use      Vaping Use: Never used    Alcohol use: Yes      Comment: 2 beers a month    Drug use: No    Social His Suspension 1 spray by Each Nare route daily. • Polyvinyl Alcohol-Povidone (REFRESH OP) Apply to eye as needed. • HYDROcodone-acetaminophen 5-325 MG Oral Tab Take 1 tablet by mouth every 4 (four) hours as needed.   0   • acetaminophen 325 MG Oral Tab 11.5\" (1.816 m)   Wt 267 lb 6.4 oz (121.3 kg)   SpO2 97%   BMI 36.77 kg/m²  Estimated body mass index is 36.77 kg/m² as calculated from the following:    Height as of this encounter: 5' 11.5\" (1.816 m). Weight as of this encounter: 267 lb 6.4 oz (121. Birth to 62yrs(2 of 3 - PCV13) due on 11/08/2020  Annual Depression Screen due on 06/20/2021  Annual Physical due on 06/20/2021    Patient/Caregiver Education: Patient/Caregiver Education: There are no barriers to learning. Medical education done.    [employer]

## 2021-07-01 RX ORDER — DIAZEPAM 5 MG/1
TABLET ORAL
Qty: 30 TABLET | Refills: 0 | Status: SHIPPED | OUTPATIENT
Start: 2021-07-01 | End: 2021-07-31

## 2021-07-01 NOTE — TELEPHONE ENCOUNTER
Future appt:     Your appointments     Date & Time Appointment Department [facility])    Nov 27, 2021  8:00 AM CST Follow Up Visit with Melecio Desai MD 25 [facility], [facility] ([facility])            [employer]

## 2021-07-07 LAB
AMYLASE: 52
BUN: 24
CREATININE: 1.35
GFR AFRICAN AMERICAN: 66
GFR NON-AFRICAN AMERICAN: 54
GLUCOSE: 106
HCT: 47.4
HGB: 15.3
LIPASE: 18
MCH: 30 PG
MCHC: 32.2 G/DL
MCV: 93.3 FL
MPV: 7.4
PLT: 276
POTASSIUM: 4.4
RBC: 5.08 X 10-6/UL
RDW: 14.9 %
WBC: 6.7 X 10-3/UL

## 2021-07-31 RX ORDER — DIAZEPAM 5 MG/1
TABLET ORAL
Qty: 30 TABLET | Refills: 0 | Status: SHIPPED | OUTPATIENT
Start: 2021-07-31 | End: 2021-09-01

## 2021-07-31 NOTE — TELEPHONE ENCOUNTER
Future appt:     Your appointments     Date & Time Appointment Department [facility])    Nov 27, 2021  8:00 AM CST Follow Up Visit with Meri Toney MD 25 [facility], [facility] ([facility])            [address]

## 2021-07-31 NOTE — TELEPHONE ENCOUNTER
PCP is out of the office. Refill sent to pharmacy. [facility] prescription monitoring program reviewed.

## 2021-08-04 LAB
CREATININE: 1.42 MG/DL (ref 0.6–1.2)
GFR NON-AFRICAN AMERICAN: 51
GLUCOSE: 117

## 2021-08-21 NOTE — TELEPHONE ENCOUNTER
Future appt:     Your appointments     Date & Time Appointment Department [facility])    Nov 27, 2021  8:00 AM CST Follow Up Visit with Gregg Betancur MD 25 [facility], [facility] ([facility])            [employer]

## 2021-08-23 RX ORDER — ZOLPIDEM TARTRATE 10 MG/1
TABLET ORAL
Qty: 90 TABLET | Refills: 0 | Status: SHIPPED | OUTPATIENT
Start: 2021-08-23 | End: 2021-11-12

## 2021-08-23 RX ORDER — ATORVASTATIN CALCIUM 10 MG/1
TABLET, FILM COATED ORAL
Qty: 90 TABLET | Refills: 0 | Status: SHIPPED | OUTPATIENT
Start: 2021-08-23 | End: 2021-11-12

## 2021-09-01 RX ORDER — DIAZEPAM 5 MG/1
TABLET ORAL
Qty: 30 TABLET | Refills: 0 | Status: SHIPPED | OUTPATIENT
Start: 2021-09-01 | End: 2021-10-01

## 2021-09-01 NOTE — TELEPHONE ENCOUNTER
Future appt:     Your appointments     Date & Time Appointment Department [facility])    Nov 27, 2021  8:00 AM CST Follow Up Visit with Rafael Gardner MD [address] [place] [address], [facility])            [employer]

## 2021-10-01 RX ORDER — DIAZEPAM 5 MG/1
TABLET ORAL
Qty: 30 TABLET | Refills: 0 | Status: SHIPPED | OUTPATIENT
Start: 2021-10-01 | End: 2021-11-01

## 2021-10-01 NOTE — TELEPHONE ENCOUNTER
Future appt:     Your appointments     Date & Time Appointment Department [facility])    Oct 04, 2021  9:15 AM CDT Sleep Follow Up with Juan David Collins, [address] [place] [address] [facility] (Eliecer Orellana)        Nov 13, 2021  9:0

## 2021-10-04 ENCOUNTER — OFFICE VISIT (OUTPATIENT)
Dept: FAMILY MEDICINE CLINIC | Facility: CLINIC | Age: 56
End: 2021-10-04
Payer: COMMERCIAL

## 2021-10-04 ENCOUNTER — PATIENT MESSAGE (OUTPATIENT)
Dept: FAMILY MEDICINE CLINIC | Facility: CLINIC | Age: 56
End: 2021-10-04

## 2021-10-04 VITALS
TEMPERATURE: 97 F | OXYGEN SATURATION: 97 % | RESPIRATION RATE: 18 BRPM | BODY MASS INDEX: 36.7 KG/M2 | HEART RATE: 82 BPM | HEIGHT: 71.5 IN | SYSTOLIC BLOOD PRESSURE: 118 MMHG | DIASTOLIC BLOOD PRESSURE: 62 MMHG | WEIGHT: 268 LBS

## 2021-10-04 DIAGNOSIS — G47.33 OSA (OBSTRUCTIVE SLEEP APNEA): Primary | ICD-10-CM

## 2021-10-04 DIAGNOSIS — F17.200 SMOKER: ICD-10-CM

## 2021-10-04 DIAGNOSIS — F41.1 ANXIETY STATE: ICD-10-CM

## 2021-10-04 PROBLEM — H43.399 VITREOUS OPACITIES: Status: ACTIVE | Noted: 2017-09-29

## 2021-10-04 PROBLEM — N25.81 SECONDARY HYPERPARATHYROIDISM (HCC): Status: ACTIVE | Noted: 2019-07-09

## 2021-10-04 PROBLEM — Z09 SURGICAL FOLLOW-UP CARE: Status: ACTIVE | Noted: 2018-10-12

## 2021-10-04 PROBLEM — N18.31 STAGE 3A CHRONIC KIDNEY DISEASE (HCC): Status: ACTIVE | Noted: 2017-02-14

## 2021-10-04 PROBLEM — R69 CHRONIC DISEASE: Status: ACTIVE | Noted: 2018-10-26

## 2021-10-04 PROBLEM — B33.8 BK VIRUS NEPHROPATHY: Status: ACTIVE | Noted: 2017-02-14

## 2021-10-04 PROBLEM — H43.393 VITREOUS FLOATERS OF BOTH EYES: Status: ACTIVE | Noted: 2018-02-23

## 2021-10-04 PROBLEM — E11.3593 TYPE 2 DIABETES MELLITUS WITH PROLIFERATIVE DIABETIC RETINOPATHY WITHOUT MACULAR EDEMA, BILATERAL (HCC): Status: ACTIVE | Noted: 2021-10-04

## 2021-10-04 PROBLEM — Z91.199: Status: ACTIVE | Noted: 2018-10-05

## 2021-10-04 PROBLEM — H25.812 COMBINED FORMS OF AGE-RELATED CATARACT, LEFT EYE: Status: ACTIVE | Noted: 2017-09-29

## 2021-10-04 PROBLEM — H40.053 OCULAR HYPERTENSION, BILATERAL: Status: ACTIVE | Noted: 2021-10-04

## 2021-10-04 PROBLEM — Z91.19: Status: ACTIVE | Noted: 2018-10-05

## 2021-10-04 PROBLEM — D84.9 IMMUNOSUPPRESSION (HCC): Status: ACTIVE | Noted: 2021-09-17

## 2021-10-04 PROBLEM — Z96.1 H/O ARTIFICIAL EYE LENS: Status: ACTIVE | Noted: 2018-02-23

## 2021-10-04 PROBLEM — H26.492 OTHER SECONDARY CATARACT, LEFT EYE: Status: ACTIVE | Noted: 2021-10-04

## 2021-10-04 PROBLEM — E11.311 DIABETIC MACULAR EDEMA (HCC): Status: ACTIVE | Noted: 2021-10-04

## 2021-10-04 PROBLEM — H35.20: Status: ACTIVE | Noted: 2021-10-04

## 2021-10-04 PROBLEM — H43.822 VITREOMACULAR ADHESION OF LEFT EYE: Status: ACTIVE | Noted: 2018-11-08

## 2021-10-04 PROBLEM — H43.819 VITREOUS DEGENERATION: Status: ACTIVE | Noted: 2021-10-04

## 2021-10-04 PROBLEM — E66.9 OBESITY: Status: ACTIVE | Noted: 2018-12-03

## 2021-10-04 PROBLEM — N05.8 BK VIRUS NEPHROPATHY: Status: ACTIVE | Noted: 2017-02-14

## 2021-10-04 PROBLEM — E08.3553: Status: ACTIVE | Noted: 2021-10-04

## 2021-10-04 PROBLEM — E13.319 RETINOPATHY DUE TO SECONDARY DIABETES MELLITUS (HCC): Status: ACTIVE | Noted: 2018-09-19

## 2021-10-04 PROBLEM — H40.1110 PRIMARY OPEN ANGLE GLAUCOMA OF RIGHT EYE: Status: ACTIVE | Noted: 2018-09-19

## 2021-10-04 PROBLEM — E11.3519 PROLIFERATIVE RETINOPATHY WITH RETINAL EDEMA DUE TO TYPE 2 DIABETES MELLITUS (HCC): Status: ACTIVE | Noted: 2017-09-29

## 2021-10-04 PROBLEM — H35.60 RETINAL HEMORRHAGE: Status: ACTIVE | Noted: 2019-02-08

## 2021-10-04 PROBLEM — H40.1120 PRIMARY OPEN ANGLE GLAUCOMA OF LEFT EYE: Status: ACTIVE | Noted: 2018-09-19

## 2021-10-04 PROBLEM — I10: Status: ACTIVE | Noted: 2018-10-05

## 2021-10-04 PROBLEM — E11.9 TYPE 2 OR UNSPECIFIED TYPE DIABETES MELLITUS: Status: ACTIVE | Noted: 2021-10-04

## 2021-10-04 PROBLEM — H43.393 VITREOUS OPACITIES OF BOTH EYES: Status: ACTIVE | Noted: 2019-05-21

## 2021-10-04 PROBLEM — E11.3513: Status: ACTIVE | Noted: 2021-10-04

## 2021-10-04 PROBLEM — H43.829 VITREOMACULAR TRACTION SYNDROME: Status: ACTIVE | Noted: 2017-09-29

## 2021-10-04 PROBLEM — E10.22: Status: ACTIVE | Noted: 2017-02-14

## 2021-10-04 PROBLEM — H40.059 RAISED INTRAOCULAR PRESSURE: Status: ACTIVE | Noted: 2018-11-08

## 2021-10-04 PROCEDURE — 99214 OFFICE O/P EST MOD 30 MIN: CPT | Performed by: NURSE PRACTITIONER

## 2021-10-04 PROCEDURE — 3008F BODY MASS INDEX DOCD: CPT | Performed by: NURSE PRACTITIONER

## 2021-10-04 PROCEDURE — 3078F DIAST BP <80 MM HG: CPT | Performed by: NURSE PRACTITIONER

## 2021-10-04 PROCEDURE — 3074F SYST BP LT 130 MM HG: CPT | Performed by: NURSE PRACTITIONER

## 2021-10-04 RX ORDER — TAMSULOSIN HYDROCHLORIDE 0.4 MG/1
0.4 CAPSULE ORAL 2 TIMES DAILY
COMMUNITY

## 2021-10-04 RX ORDER — OMEPRAZOLE 40 MG/1
40 CAPSULE, DELAYED RELEASE ORAL
COMMUNITY

## 2021-10-04 RX ORDER — METOPROLOL SUCCINATE 50 MG/1
TABLET, EXTENDED RELEASE ORAL
COMMUNITY

## 2021-10-04 RX ORDER — VARENICLINE TARTRATE 0.5 (11)-1
0.5 KIT ORAL 2 TIMES DAILY
Qty: 60 TABLET | Refills: 2 | Status: SHIPPED | OUTPATIENT
Start: 2021-10-04 | End: 2021-10-07

## 2021-10-04 RX ORDER — VARENICLINE TARTRATE 0.5 (11)-1
0.5 KIT ORAL 2 TIMES DAILY
Qty: 60 TABLET | Refills: 2 | Status: SHIPPED | OUTPATIENT
Start: 2021-10-04 | End: 2021-10-04

## 2021-10-04 RX ORDER — METOPROLOL SUCCINATE 100 MG/1
TABLET, EXTENDED RELEASE ORAL
COMMUNITY

## 2021-10-04 NOTE — PROGRESS NOTES
[facility]  [facility]GRESS [facility]        HPI:   This is a 60year old male coming in for Patient presents with:  Obstructive Sleep Apnea (NAKUL)      HPI:     Patient is present for follow up on sleep therapy.  States that he had to get a new hypertension    • HYPERLIPIDEMIA    • Hyperlipidemia    • HYPERTENSION      Past Surgical History:   Procedure Laterality Date   • ABLATION  2021   • OTHER      Left shoulder surgery, left testicle removal, left trigger finger injection, kidney and pancrea • tamsulosin (FLOMAX) cap tamsulosin 0.4 mg capsule     • Varenicline Tartrate (CHANTIX STARTING MONTH JAMEL) 0.5 MG X 11 & 1 MG X 42 Oral Misc Take 0.5 mg by mouth 2 (two) times daily.  Take as directed 60 tablet 2   • DIAZEPAM 5 MG Oral Tab TAKE 1 TABLET mycophenolate sodium 360 MG Oral Tab  mg 2 (two) times daily before meals. • Polyethylene Glycol 3350 Oral Powder as needed.           Ready to quit: Not Answered  Counseling given: Not Answered  Comment: a cigrate ever so often         Problem unspecified eye     Proliferative diabetic retinopathy ([facility] [address]     Retinal hemorrhage     Retinopathy due to secondary diabetes mellitus ([facility] [address]     Sleep apnea     Diabetes mellitus due to underlying condition with stable proliferative diabetic retinopathy, b cerumen. Nose: Nose normal.      Mouth/Throat:      Mouth: Mucous membranes are moist.      Comments: Mal 4 Tonsils 0  Eyes:      Conjunctiva/sclera: Conjunctivae normal.   Neck:      Thyroid: No thyromegaly.    Cardiovascular:      Rate and Rhythm: No Advised if still with sleep apnea and not using CPAP has a  7 fold increase in risk of heart attack, stroke, abnormal heart rhythm  and death,  increased risk of driving accidents. Advised to refrain from driving when sleepy.     COMPLIANCE is req

## 2021-10-04 NOTE — PATIENT INSTRUCTIONS
Chantix to [facility] per request.     Continue sleep therapy. Follow-up in 6 months - sooner if needed.      Advised if still with sleep apnea and not using CPAP has a  7 fold increase in risk of heart attack, stroke, abnormal heart rhythm  and death,  i

## 2021-10-04 NOTE — TELEPHONE ENCOUNTER
From: Derrick Hall  To: RUSTY Page  Sent: 10/4/2021 2:11 PM CDT  Subject: Chantix    Melody, I know we talked about the chantix prescription and you sent in to [facility] , when I went to check on status , they are trying to fill Tylenol baby

## 2021-10-07 ENCOUNTER — TELEPHONE (OUTPATIENT)
Dept: FAMILY MEDICINE CLINIC | Facility: CLINIC | Age: 56
End: 2021-10-07

## 2021-10-07 RX ORDER — VARENICLINE TARTRATE 0.5 MG/1
0.5 TABLET, FILM COATED ORAL 2 TIMES DAILY
Qty: 60 TABLET | Refills: 0 | Status: SHIPPED | OUTPATIENT
Start: 2021-10-07 | End: 2021-12-14

## 2021-10-07 NOTE — TELEPHONE ENCOUNTER
RE: chantex        can this be substitued with Apo-Varenicline 0.5mg ?   ( sub per pharmacist )    to [facility] in [place]              please advise       Future Appointments   Date Time Provider Yvonne Soni   11/13/2021  9:00 AM Rafael Gardner MD

## 2021-11-01 RX ORDER — DIAZEPAM 5 MG/1
TABLET ORAL
Qty: 30 TABLET | Refills: 0 | Status: SHIPPED | OUTPATIENT
Start: 2021-11-01 | End: 2021-12-08

## 2021-11-01 NOTE — TELEPHONE ENCOUNTER
Diazepam: 10/1/21     Return in about 6 months (around 11/26/2021) for physical.    Future appt:     Your appointments     Date & Time Appointment Department [facility])    Dec 11, 2021  8:40 AM CST Follow Up Visit with Rafael Gardner MD [facility]

## 2021-11-12 RX ORDER — ATORVASTATIN CALCIUM 10 MG/1
TABLET, FILM COATED ORAL
Qty: 90 TABLET | Refills: 3 | Status: SHIPPED | OUTPATIENT
Start: 2021-11-12

## 2021-11-12 RX ORDER — ZOLPIDEM TARTRATE 10 MG/1
TABLET ORAL
Qty: 90 TABLET | Refills: 0 | Status: SHIPPED | OUTPATIENT
Start: 2021-11-12 | End: 2022-01-31

## 2021-11-12 NOTE — TELEPHONE ENCOUNTER
Future appt:     Your appointments     Date & Time Appointment Department [facility])    Dec 11, 2021  8:40 AM CST Follow Up Visit with Gregg Betancur MD 25 [facility], [facility])        Apr 09, 2022  8:00 AM

## 2021-11-18 DIAGNOSIS — J44.9 CHRONIC OBSTRUCTIVE PULMONARY DISEASE, UNSPECIFIED COPD TYPE (HCC): Primary | ICD-10-CM

## 2021-11-18 NOTE — TELEPHONE ENCOUNTER
Future appt:     Your appointments     Date & Time Appointment Department [facility])    Dec 11, 2021  8:40 AM CST Follow Up Visit with Rafael Gardner MD [address] [place] [address], [place] ([facility])        Apr 09, 2022  8:00 AM

## 2021-11-19 DIAGNOSIS — J44.9 CHRONIC OBSTRUCTIVE PULMONARY DISEASE, UNSPECIFIED COPD TYPE (HCC): Primary | ICD-10-CM

## 2021-11-19 RX ORDER — ALBUTEROL SULFATE 90 UG/1
AEROSOL, METERED RESPIRATORY (INHALATION)
Qty: 8.5 G | Refills: 0 | Status: SHIPPED | OUTPATIENT
Start: 2021-11-19 | End: 2021-12-08

## 2021-11-19 NOTE — TELEPHONE ENCOUNTER
Future appt:     Your appointments     Date & Time Appointment Department [facility])    Dec 11, 2021  8:40 AM CST Follow Up Visit with Adi Coto MD [address] [place], [place] [address] ([facility])        Apr 09, 2022  8:00 AM

## 2021-12-06 PROCEDURE — 3044F HG A1C LEVEL LT 7.0%: CPT | Performed by: FAMILY MEDICINE

## 2021-12-08 DIAGNOSIS — J44.9 CHRONIC OBSTRUCTIVE PULMONARY DISEASE, UNSPECIFIED COPD TYPE (HCC): ICD-10-CM

## 2021-12-08 RX ORDER — ALBUTEROL SULFATE 90 UG/1
AEROSOL, METERED RESPIRATORY (INHALATION)
Qty: 8.5 G | Refills: 0 | Status: SHIPPED | OUTPATIENT
Start: 2021-12-08

## 2021-12-08 RX ORDER — DIAZEPAM 5 MG/1
5 TABLET ORAL EVERY 8 HOURS PRN
Qty: 30 TABLET | Refills: 2 | Status: SHIPPED | OUTPATIENT
Start: 2021-12-08

## 2021-12-11 ENCOUNTER — OFFICE VISIT (OUTPATIENT)
Dept: FAMILY MEDICINE CLINIC | Facility: CLINIC | Age: 56
End: 2021-12-11
Payer: COMMERCIAL

## 2021-12-11 VITALS
OXYGEN SATURATION: 98 % | HEART RATE: 65 BPM | RESPIRATION RATE: 18 BRPM | SYSTOLIC BLOOD PRESSURE: 128 MMHG | TEMPERATURE: 97 F | WEIGHT: 272.63 LBS | HEIGHT: 71.5 IN | BODY MASS INDEX: 37.33 KG/M2 | DIASTOLIC BLOOD PRESSURE: 58 MMHG

## 2021-12-11 DIAGNOSIS — Z12.11 COLON CANCER SCREENING: ICD-10-CM

## 2021-12-11 DIAGNOSIS — E11.3513 TYPE 2 DIABETES MELLITUS WITH BOTH EYES AFFECTED BY PROLIFERATIVE RETINOPATHY AND MACULAR EDEMA, WITHOUT LONG-TERM CURRENT USE OF INSULIN (HCC): ICD-10-CM

## 2021-12-11 DIAGNOSIS — I10 ESSENTIAL HYPERTENSION: ICD-10-CM

## 2021-12-11 DIAGNOSIS — J44.9 CHRONIC OBSTRUCTIVE PULMONARY DISEASE, UNSPECIFIED COPD TYPE (HCC): Primary | ICD-10-CM

## 2021-12-11 DIAGNOSIS — Z94.0 STATUS POST KIDNEY TRANSPLANT: ICD-10-CM

## 2021-12-11 PROCEDURE — 3008F BODY MASS INDEX DOCD: CPT | Performed by: FAMILY MEDICINE

## 2021-12-11 PROCEDURE — 3078F DIAST BP <80 MM HG: CPT | Performed by: FAMILY MEDICINE

## 2021-12-11 PROCEDURE — 3074F SYST BP LT 130 MM HG: CPT | Performed by: FAMILY MEDICINE

## 2021-12-11 PROCEDURE — 99214 OFFICE O/P EST MOD 30 MIN: CPT | Performed by: FAMILY MEDICINE

## 2021-12-11 RX ORDER — NIFEDIPINE 30 MG/1
1 TABLET, FILM COATED, EXTENDED RELEASE ORAL DAILY
COMMUNITY
Start: 2021-12-03

## 2021-12-11 RX ORDER — TACROLIMUS 1 MG/1
2 TABLET, EXTENDED RELEASE ORAL DAILY
COMMUNITY
End: 2021-12-11

## 2021-12-11 RX ORDER — TACROLIMUS 1 MG/1
2 TABLET, EXTENDED RELEASE ORAL DAILY
Qty: 60 TABLET | Refills: 0 | COMMUNITY
Start: 2021-12-11

## 2021-12-11 NOTE — PATIENT INSTRUCTIONS
Continue current medications  Recommend cologuard. Advice low carb in diet, AVOID FOOD WITH HIGH GLYCEMIC INDEX, have smaller portion meal, avoid bread, pasta and potatoes, no juice or soda, don't eat late at night, exercise,  and weight loss.    Continue

## 2021-12-11 NOTE — PROGRESS NOTES
[address]  PROGRESS NOTE  Chief Complaint:   Patient presents with: Follow - Up      HPI:   This is a 60year old male with history of for kidney transplant, COPD, hypertension and diabetes type 2 presents for follow-up.     Has  been c cigrate ever so often    Vaping Use      Vaping Use: Never used    Alcohol use: Yes      Comment: 2 beers a month    Drug use: No    Social History    Social History Narrative      Single, works at [employer]    Family History:  Family History   Prob every other day. • FLOVENT  MCG/ACT Inhalation Aerosol INHALE 1 PUFF BY MOUTH TWICE DAILY 12 g 0   • Fexofenadine HCl 180 MG Oral Tab Take 180 mg by mouth daily.      • Fluticasone Propionate 50 MCG/ACT Nasal Suspension 1 spray by Each Nare route pain, nausea, vomiting, constipation, diarrhea, or blood in stool. MUSCULOSKELETAL:  Denies weakness, muscle aches, back pain, joint pain, swelling or stiffness.   NEUROLOGICAL:  Denies headache, dizziness, syncope, numbness or tingling in the extremities, this visit:    Chronic obstructive pulmonary disease, unspecified COPD type ([facility]    Essential hypertension    Type 2 diabetes mellitus with both eyes affected by proliferative retinopathy and macular edema, without long-term current use of insulin ([facility] transplant ([facility]     Pure hypercholesterolemia     Essential hypertension     Insomnia     Status post kidney transplant     Organ or tissue replaced by transplant     Hypertension     NAKUL (obstructive sleep apnea)     PLMD (periodic limb movement disorder with proliferative diabetic retinopathy without macular edema, bilateral (HCC)     Type 2 diabetes mellitus with proliferative diabetic retinopathy with macular edema, bilateral (HCC)      Meche Masters MD    This note was created utilizing Dragon speech r

## 2021-12-11 NOTE — TELEPHONE ENCOUNTER
Future appt:     Your appointments     Date & Time Appointment Department [facility])    Dec 11, 2021  8:40 AM CST Follow Up Visit with Wili Frank MD [address] [place] [address], [facility])        Apr 09, 2022  8:00 AM

## 2021-12-14 RX ORDER — VARENICLINE 0.5 MG/1
TABLET, FILM COATED ORAL
Qty: 60 TABLET | Refills: 0 | Status: SHIPPED | OUTPATIENT
Start: 2021-12-14

## 2021-12-30 ENCOUNTER — TELEPHONE (OUTPATIENT)
Dept: FAMILY MEDICINE CLINIC | Facility: CLINIC | Age: 56
End: 2021-12-30

## 2021-12-30 DIAGNOSIS — Z12.11 COLON CANCER SCREENING: Primary | ICD-10-CM

## 2021-12-30 DIAGNOSIS — R19.5 POSITIVE COLORECTAL CANCER SCREENING USING COLOGUARD TEST: ICD-10-CM

## 2021-12-30 PROBLEM — I10 HYPERTENSION: Status: RESOLVED | Noted: 2017-05-26 | Resolved: 2021-12-30

## 2021-12-30 PROBLEM — H25.812 COMBINED FORMS OF AGE-RELATED CATARACT, LEFT EYE: Status: RESOLVED | Noted: 2017-09-29 | Resolved: 2021-12-30

## 2021-12-30 PROBLEM — R69 CHRONIC DISEASE: Status: RESOLVED | Noted: 2018-10-26 | Resolved: 2021-12-30

## 2021-12-30 PROBLEM — E11.3519 PROLIFERATIVE RETINOPATHY WITH RETINAL EDEMA DUE TO TYPE 2 DIABETES MELLITUS (HCC): Status: RESOLVED | Noted: 2017-09-29 | Resolved: 2021-12-30

## 2021-12-30 PROBLEM — H40.1120 PRIMARY OPEN ANGLE GLAUCOMA OF LEFT EYE: Status: RESOLVED | Noted: 2018-09-19 | Resolved: 2021-12-30

## 2021-12-30 PROBLEM — E11.3513: Status: RESOLVED | Noted: 2021-10-04 | Resolved: 2021-12-30

## 2021-12-30 PROBLEM — H43.393 VITREOUS FLOATERS OF BOTH EYES: Status: RESOLVED | Noted: 2018-02-23 | Resolved: 2021-12-30

## 2021-12-30 PROBLEM — E11.9 TYPE 2 OR UNSPECIFIED TYPE DIABETES MELLITUS: Status: RESOLVED | Noted: 2021-10-04 | Resolved: 2021-12-30

## 2021-12-30 PROBLEM — H43.399 VITREOUS OPACITIES: Status: RESOLVED | Noted: 2017-09-29 | Resolved: 2021-12-30

## 2021-12-30 PROBLEM — H43.822 VITREOMACULAR ADHESION OF LEFT EYE: Status: RESOLVED | Noted: 2018-11-08 | Resolved: 2021-12-30

## 2021-12-30 PROBLEM — E11.3593 TYPE 2 DIABETES MELLITUS WITH PROLIFERATIVE DIABETIC RETINOPATHY WITHOUT MACULAR EDEMA, BILATERAL (HCC): Status: RESOLVED | Noted: 2021-10-04 | Resolved: 2021-12-30

## 2021-12-30 PROBLEM — I10: Status: RESOLVED | Noted: 2018-10-05 | Resolved: 2021-12-30

## 2021-12-30 PROBLEM — H40.059 RAISED INTRAOCULAR PRESSURE: Status: RESOLVED | Noted: 2018-11-08 | Resolved: 2021-12-30

## 2021-12-30 PROBLEM — H40.1110 PRIMARY OPEN ANGLE GLAUCOMA OF RIGHT EYE: Status: RESOLVED | Noted: 2018-09-19 | Resolved: 2021-12-30

## 2021-12-30 PROBLEM — H35.20: Status: RESOLVED | Noted: 2021-10-04 | Resolved: 2021-12-30

## 2021-12-30 PROBLEM — E13.319 RETINOPATHY DUE TO SECONDARY DIABETES MELLITUS (HCC): Status: RESOLVED | Noted: 2018-09-19 | Resolved: 2021-12-30

## 2021-12-30 PROBLEM — H35.60 RETINAL HEMORRHAGE: Status: RESOLVED | Noted: 2019-02-08 | Resolved: 2021-12-30

## 2021-12-30 PROBLEM — Z09 SURGICAL FOLLOW-UP CARE: Status: RESOLVED | Noted: 2018-10-12 | Resolved: 2021-12-30

## 2021-12-30 PROBLEM — H40.053 OCULAR HYPERTENSION, BILATERAL: Status: RESOLVED | Noted: 2021-10-04 | Resolved: 2021-12-30

## 2021-12-30 PROBLEM — H43.819 VITREOUS DEGENERATION: Status: RESOLVED | Noted: 2021-10-04 | Resolved: 2021-12-30

## 2021-12-30 PROBLEM — R97.20 ELEVATED PSA: Status: RESOLVED | Noted: 2020-06-20 | Resolved: 2021-12-30

## 2021-12-30 PROBLEM — H26.492 OTHER SECONDARY CATARACT, LEFT EYE: Status: RESOLVED | Noted: 2021-10-04 | Resolved: 2021-12-30

## 2021-12-30 PROBLEM — Z91.199: Status: RESOLVED | Noted: 2018-10-05 | Resolved: 2021-12-30

## 2021-12-30 PROBLEM — B33.8 BK VIRUS NEPHROPATHY: Status: RESOLVED | Noted: 2017-02-14 | Resolved: 2021-12-30

## 2021-12-30 PROBLEM — N18.31 STAGE 3A CHRONIC KIDNEY DISEASE (HCC): Status: RESOLVED | Noted: 2017-02-14 | Resolved: 2021-12-30

## 2021-12-30 PROBLEM — Z91.19: Status: RESOLVED | Noted: 2018-10-05 | Resolved: 2021-12-30

## 2021-12-30 PROBLEM — G25.81 RLS (RESTLESS LEGS SYNDROME): Chronic | Status: RESOLVED | Noted: 2018-02-23 | Resolved: 2021-12-30

## 2021-12-30 PROBLEM — H43.829 VITREOMACULAR TRACTION SYNDROME: Status: RESOLVED | Noted: 2017-09-29 | Resolved: 2021-12-30

## 2021-12-30 PROBLEM — N05.8 BK VIRUS NEPHROPATHY: Status: RESOLVED | Noted: 2017-02-14 | Resolved: 2021-12-30

## 2021-12-30 PROBLEM — R73.9 HYPERGLYCEMIA: Status: RESOLVED | Noted: 2020-12-23 | Resolved: 2021-12-30

## 2021-12-30 NOTE — TELEPHONE ENCOUNTER
Please inform patient that his Cologuard result is positive. Recommend to see a surgeon for colonoscopy. I have placed referral for Dr. Vaughn Velasco.   Please provide patient with information and fax the result and referral.

## 2022-01-03 NOTE — TELEPHONE ENCOUNTER
Patient informed of the below results and recommendations. Referral faxed to Dr. Deepa Ortega. Patient will check his [employer] account for referral information that [facility] sent on 12/30/2021. Patient will call to schedule an appt.

## 2022-01-31 RX ORDER — ZOLPIDEM TARTRATE 10 MG/1
TABLET ORAL
Qty: 90 TABLET | Refills: 1 | Status: SHIPPED | OUTPATIENT
Start: 2022-01-31

## 2022-01-31 NOTE — TELEPHONE ENCOUNTER
Future appt:     Your appointments     Date & Time Appointment Department [facility])    Apr 09, 2022  8:00 AM CDT Sleep Follow Up with Cortney Mesa MD 25 [facility], [facility] ([facility])        Jun 11, 2022  9:00 AM

## 2022-02-02 ENCOUNTER — TELEPHONE (OUTPATIENT)
Dept: FAMILY MEDICINE CLINIC | Facility: CLINIC | Age: 57
End: 2022-02-02

## 2022-03-29 NOTE — TELEPHONE ENCOUNTER
Order, OV notes, and sleep study faxed to Trippy Bandz, [facility]. PAST MEDICAL HISTORY:  Frequent UTI

## 2022-03-31 RX ORDER — DIAZEPAM 5 MG/1
TABLET ORAL
Qty: 30 TABLET | Refills: 2 | Status: SHIPPED | OUTPATIENT
Start: 2022-03-31

## 2022-03-31 NOTE — TELEPHONE ENCOUNTER
Future appt: Your appointments     Date & Time Appointment Department [facility])    Apr 13, 2022  4:00 PM CDT Sleep Follow Up with 55 [facility], [address] [place] [address] Road, 9[facility] ([facility])        Jun 11, 2022  9:00 AM CDT Physical - Established with Federica Brooks MD 25 [facility] ([facility])            25 [phone]         Last Appointment with provider:   12/11/2021- advised Return in about 6 months (around 6/11/2022) for physical.        Cholesterol, Total (mg/dL)   Date Value   09/01/2018 121     HDL Cholesterol (mg/dL)   Date Value   09/01/2018 70 (H)     LDL Cholesterol (mg/dL)   Date Value   09/01/2018 41     Triglycerides (mg/dL)   Date Value   09/01/2018 50     Lab Results   Component Value Date    A1C 5.9 12/06/2021     Lab Results   Component Value Date    TSH 0.434 09/01/2018       No follow-ups on file.

## 2022-04-13 ENCOUNTER — OFFICE VISIT (OUTPATIENT)
Dept: FAMILY MEDICINE CLINIC | Facility: CLINIC | Age: 57
End: 2022-04-13
Payer: COMMERCIAL

## 2022-04-13 VITALS
SYSTOLIC BLOOD PRESSURE: 136 MMHG | BODY MASS INDEX: 36.65 KG/M2 | DIASTOLIC BLOOD PRESSURE: 74 MMHG | TEMPERATURE: 98 F | WEIGHT: 267.63 LBS | OXYGEN SATURATION: 95 % | HEIGHT: 71.5 IN | RESPIRATION RATE: 18 BRPM | HEART RATE: 82 BPM

## 2022-04-13 DIAGNOSIS — J44.9 CHRONIC OBSTRUCTIVE PULMONARY DISEASE, UNSPECIFIED COPD TYPE (HCC): ICD-10-CM

## 2022-04-13 DIAGNOSIS — G47.33 OSA (OBSTRUCTIVE SLEEP APNEA): Primary | ICD-10-CM

## 2022-04-13 PROCEDURE — 3008F BODY MASS INDEX DOCD: CPT | Performed by: NURSE PRACTITIONER

## 2022-04-13 PROCEDURE — 3075F SYST BP GE 130 - 139MM HG: CPT | Performed by: NURSE PRACTITIONER

## 2022-04-13 PROCEDURE — 3078F DIAST BP <80 MM HG: CPT | Performed by: NURSE PRACTITIONER

## 2022-04-13 PROCEDURE — 99214 OFFICE O/P EST MOD 30 MIN: CPT | Performed by: NURSE PRACTITIONER

## 2022-06-06 PROCEDURE — 3044F HG A1C LEVEL LT 7.0%: CPT | Performed by: FAMILY MEDICINE

## 2022-06-11 ENCOUNTER — OFFICE VISIT (OUTPATIENT)
Dept: FAMILY MEDICINE CLINIC | Facility: CLINIC | Age: 57
End: 2022-06-11
Payer: COMMERCIAL

## 2022-06-11 VITALS
RESPIRATION RATE: 18 BRPM | WEIGHT: 258 LBS | TEMPERATURE: 97 F | SYSTOLIC BLOOD PRESSURE: 140 MMHG | OXYGEN SATURATION: 98 % | HEIGHT: 72 IN | DIASTOLIC BLOOD PRESSURE: 80 MMHG | HEART RATE: 77 BPM | BODY MASS INDEX: 34.95 KG/M2

## 2022-06-11 DIAGNOSIS — Z87.891 QUIT SMOKING: ICD-10-CM

## 2022-06-11 DIAGNOSIS — M54.41 CHRONIC BILATERAL LOW BACK PAIN WITH RIGHT-SIDED SCIATICA: ICD-10-CM

## 2022-06-11 DIAGNOSIS — G89.29 CHRONIC BILATERAL LOW BACK PAIN WITH RIGHT-SIDED SCIATICA: ICD-10-CM

## 2022-06-11 DIAGNOSIS — Z12.5 PROSTATE CANCER SCREENING: ICD-10-CM

## 2022-06-11 DIAGNOSIS — I10 ESSENTIAL HYPERTENSION: ICD-10-CM

## 2022-06-11 DIAGNOSIS — E78.5 HYPERLIPIDEMIA, UNSPECIFIED HYPERLIPIDEMIA TYPE: ICD-10-CM

## 2022-06-11 DIAGNOSIS — Z00.00 PHYSICAL EXAM: Primary | ICD-10-CM

## 2022-06-11 PROCEDURE — 99396 PREV VISIT EST AGE 40-64: CPT | Performed by: FAMILY MEDICINE

## 2022-06-11 PROCEDURE — 3008F BODY MASS INDEX DOCD: CPT | Performed by: FAMILY MEDICINE

## 2022-06-11 PROCEDURE — 3077F SYST BP >= 140 MM HG: CPT | Performed by: FAMILY MEDICINE

## 2022-06-11 PROCEDURE — 3079F DIAST BP 80-89 MM HG: CPT | Performed by: FAMILY MEDICINE

## 2022-06-11 RX ORDER — CYCLOBENZAPRINE HCL 10 MG
10 TABLET ORAL NIGHTLY PRN
Qty: 30 TABLET | Refills: 0 | Status: SHIPPED | OUTPATIENT
Start: 2022-06-11

## 2022-06-11 RX ORDER — BUPROPION HYDROCHLORIDE 150 MG/1
150 TABLET, EXTENDED RELEASE ORAL 2 TIMES DAILY
Qty: 60 TABLET | Refills: 3 | Status: SHIPPED | OUTPATIENT
Start: 2022-06-11

## 2022-06-11 NOTE — PATIENT INSTRUCTIONS
Continue current medications  Discussed quit smoking. Blood pressure slightly elevated. Advice low salt diet and exercise. Monitor your blood pressure. Return to clinic if systolic blood pressure more than 705 or diastolic more than 987. Recommend healthy diet, exercise and weight loss    Advice low cholesterol diet and exercise. May use fish oil supplement. Advice rest, heating pad, tylenol as needed   Take cyclobenzaprine as needed. Medication will make you drowsy, so no driving after taking medication. Schedule fasting labs.

## 2022-07-06 RX ORDER — DIAZEPAM 5 MG/1
TABLET ORAL
Qty: 30 TABLET | Refills: 0 | Status: SHIPPED | OUTPATIENT
Start: 2022-07-06

## 2022-07-06 NOTE — TELEPHONE ENCOUNTER
Diazepam: 3/31/22   Return in about 6 months (around 12/11/2022) for follow up. Future appt: Your appointments     Date & Time Appointment Department [facility])    Dec 10, 2022  9:30 AM CST Follow Up Visit with Michael Carson MD 25 [facility], Stacey Zazueta ([facility])            25 [facility]  [place] [address] [phone]  [phone]        Last Appointment with provider:   6/11/2022  Last appointment [facility]:  6/11/2022  Cholesterol, Total (mg/dL)   Date Value   09/01/2018 121     HDL Cholesterol (mg/dL)   Date Value   09/01/2018 70 (H)     LDL Cholesterol (mg/dL)   Date Value   09/01/2018 41     Triglycerides (mg/dL)   Date Value   09/01/2018 50     Lab Results   Component Value Date    A1C 5.9 12/06/2021     Lab Results   Component Value Date    TSH 0.434 09/01/2018       No follow-ups on file.

## 2022-07-25 DIAGNOSIS — M54.41 CHRONIC BILATERAL LOW BACK PAIN WITH RIGHT-SIDED SCIATICA: ICD-10-CM

## 2022-07-25 DIAGNOSIS — G89.29 CHRONIC BILATERAL LOW BACK PAIN WITH RIGHT-SIDED SCIATICA: ICD-10-CM

## 2022-07-25 RX ORDER — CYCLOBENZAPRINE HCL 10 MG
TABLET ORAL
Qty: 30 TABLET | Refills: 0 | Status: SHIPPED | OUTPATIENT
Start: 2022-07-25

## 2022-07-25 NOTE — TELEPHONE ENCOUNTER
Return in about 6 months (around 12/11/2022) for follow up. Future appt: Your appointments     Date & Time Appointment Department [facility])    Dec 10, 2022  9:30 AM CST Follow Up Visit with Hamlet Burgess MD 25 [facility])            25 [phone]  [phone]        Last Appointment with provider:   6/11/2022  Last appointment at [facility]:  6/11/2022  Cholesterol, Total (mg/dL)   Date Value   09/01/2018 121     HDL Cholesterol (mg/dL)   Date Value   09/01/2018 70 (H)     LDL Cholesterol (mg/dL)   Date Value   09/01/2018 41     Triglycerides (mg/dL)   Date Value   09/01/2018 50     Lab Results   Component Value Date    A1C 5.9 12/06/2021     Lab Results   Component Value Date    TSH 0.434 09/01/2018       No follow-ups on file.

## 2022-08-08 RX ORDER — DIAZEPAM 5 MG/1
TABLET ORAL
Qty: 30 TABLET | Refills: 0 | Status: SHIPPED | OUTPATIENT
Start: 2022-08-08

## 2022-08-08 NOTE — TELEPHONE ENCOUNTER
Future appt: Your appointments     Date & Time Appointment Department [facility])    Dec 10, 2022  9:30 AM CST Follow Up Visit with Naomi Carson MD 25 [facility] Nick ([facility])            25 [phone]  [phone]        Last Appointment with provider:   6/11/2022  [facility]:  6/11/2022  Cholesterol, Total (mg/dL)   Date Value   09/01/2018 121     HDL Cholesterol (mg/dL)   Date Value   09/01/2018 70 (H)     LDL Cholesterol (mg/dL)   Date Value   09/01/2018 41     Triglycerides (mg/dL)   Date Value   09/01/2018 50     Lab Results   Component Value Date    A1C 5.9 12/06/2021     Lab Results   Component Value Date    TSH 0.434 09/01/2018       No follow-ups on file. Last rf 7/6/22     Return in about 6 months (around 12/11/2022) for follow up.

## 2022-09-06 ENCOUNTER — TELEPHONE (OUTPATIENT)
Dept: FAMILY MEDICINE CLINIC | Facility: CLINIC | Age: 57
End: 2022-09-06

## 2022-09-06 PROCEDURE — 3044F HG A1C LEVEL LT 7.0%: CPT | Performed by: FAMILY MEDICINE

## 2022-09-06 RX ORDER — DIAZEPAM 5 MG/1
5 TABLET ORAL EVERY 8 HOURS PRN
Qty: 30 TABLET | Refills: 2 | Status: SHIPPED | OUTPATIENT
Start: 2022-09-06

## 2022-09-06 NOTE — TELEPHONE ENCOUNTER
Last appt 6/11/22 advised Return in about 6 months (around 12/11/2022) for follow up. Future appt: Your appointments     Date & Time Appointment Department [facility])    Dec 10, 2022  9:30 AM CST Follow Up Visit with Ramirez Kelley MD [address] [place] [address])            25 [facility] 1076 [phone]  [phone]        Last Appointment with provider:   6/11/2022  [facility]:  6/11/2022  Cholesterol, Total (mg/dL)   Date Value   09/01/2018 121     HDL Cholesterol (mg/dL)   Date Value   09/01/2018 70 (H)     LDL Cholesterol (mg/dL)   Date Value   09/01/2018 41     Triglycerides (mg/dL)   Date Value   09/01/2018 50     Lab Results   Component Value Date    A1C 5.9 12/06/2021     Lab Results   Component Value Date    TSH 0.434 09/01/2018       No follow-ups on file.

## 2022-09-06 NOTE — TELEPHONE ENCOUNTER
Spoke to pt stated that he is currently in the hospital with a small bowel obstruction. Pt aware needs to make appt to be seen to complete disability papers.  Will make appt once out of hospital.     No further questions

## 2022-09-09 ENCOUNTER — OFFICE VISIT (OUTPATIENT)
Dept: FAMILY MEDICINE CLINIC | Facility: CLINIC | Age: 57
End: 2022-09-09
Payer: COMMERCIAL

## 2022-09-09 VITALS
BODY MASS INDEX: 33.07 KG/M2 | DIASTOLIC BLOOD PRESSURE: 72 MMHG | HEIGHT: 72 IN | WEIGHT: 244.19 LBS | RESPIRATION RATE: 18 BRPM | TEMPERATURE: 97 F | OXYGEN SATURATION: 99 % | SYSTOLIC BLOOD PRESSURE: 138 MMHG | HEART RATE: 85 BPM

## 2022-09-09 DIAGNOSIS — Z94.0 STATUS POST KIDNEY TRANSPLANT: ICD-10-CM

## 2022-09-09 DIAGNOSIS — I10 ESSENTIAL HYPERTENSION: ICD-10-CM

## 2022-09-09 DIAGNOSIS — K56.609 SBO (SMALL BOWEL OBSTRUCTION) (HCC): Primary | ICD-10-CM

## 2022-09-09 PROCEDURE — 3075F SYST BP GE 130 - 139MM HG: CPT | Performed by: FAMILY MEDICINE

## 2022-09-09 PROCEDURE — 99214 OFFICE O/P EST MOD 30 MIN: CPT | Performed by: FAMILY MEDICINE

## 2022-09-09 PROCEDURE — 3078F DIAST BP <80 MM HG: CPT | Performed by: FAMILY MEDICINE

## 2022-09-09 PROCEDURE — 3008F BODY MASS INDEX DOCD: CPT | Performed by: FAMILY MEDICINE

## 2022-09-09 RX ORDER — CARBOXYMETHYLCELLULOSE SODIUM 5 MG/ML
1 SOLUTION/ DROPS OPHTHALMIC AS NEEDED
COMMUNITY

## 2022-09-09 NOTE — PATIENT INSTRUCTIONS
Continue current medications  Blood pressure slightly elevated. Advice low salt diet and exercise. Monitor your blood pressure. Return to clinic if systolic blood pressure more than 959 or diastolic more than 196. Bowel obstruction improved. Recommend low fiber diet. Boyd diet. Drink plenty of fluids. Return to clinic if any concern or symptoms return. Form filled out for short term disability.

## 2022-09-14 RX ORDER — ZOLPIDEM TARTRATE 10 MG/1
10 TABLET ORAL NIGHTLY PRN
Qty: 90 TABLET | Refills: 1 | Status: SHIPPED | OUTPATIENT
Start: 2022-09-14

## 2022-09-14 NOTE — TELEPHONE ENCOUNTER
Future appt: Your appointments     Date & Time Appointment Department [facility])    Dec 10, 2022  9:30 AM CST Follow Up Visit with Andria Mcneill MD 25 [facility], [facility])            [address] [place], [place] [address]  [phone]        Last Appointment with provider:   Visit date not found  Last appointment at [facility]:  9/9/2022     Last physical 6/11/22 Return in about 6 months (around 12/11/2022) for follow up. Last refill 1/31/22 #90 with 1 refill       Cholesterol, Total (mg/dL)   Date Value   09/01/2018 121     HDL Cholesterol (mg/dL)   Date Value   09/01/2018 70 (H)     LDL Cholesterol (mg/dL)   Date Value   09/01/2018 41     Triglycerides (mg/dL)   Date Value   09/01/2018 50     Lab Results   Component Value Date    A1C 5.9 12/06/2021     Lab Results   Component Value Date    TSH 0.434 09/01/2018       No follow-ups on file.

## 2022-10-04 DIAGNOSIS — G89.29 CHRONIC BILATERAL LOW BACK PAIN WITH RIGHT-SIDED SCIATICA: ICD-10-CM

## 2022-10-04 DIAGNOSIS — M54.41 CHRONIC BILATERAL LOW BACK PAIN WITH RIGHT-SIDED SCIATICA: ICD-10-CM

## 2022-10-04 RX ORDER — CYCLOBENZAPRINE HCL 10 MG
10 TABLET ORAL NIGHTLY PRN
Qty: 30 TABLET | Refills: 0 | Status: SHIPPED | OUTPATIENT
Start: 2022-10-04

## 2022-10-04 NOTE — TELEPHONE ENCOUNTER
Cyclobenzaprine: 7/25/22   Return in about 6 months (around 12/11/2022) for follow up. Future appt: Your appointments     Date & Time Appointment Department [facility])    Dec 10, 2022  9:30 AM CST Follow Up Visit with Leon Roajs MD [address] [place] [address], Ynes Ezequiel ([facility])            [address] [place] [address] [facility]76 [phone]  [phone]        Last Appointment with provider:    Illness 9/9/2022- 6/11/22 Wellness  [facility]:  9/9/2022  Cholesterol, Total (mg/dL)   Date Value   09/01/2018 121     HDL Cholesterol (mg/dL)   Date Value   09/01/2018 70 (H)     LDL Cholesterol (mg/dL)   Date Value   09/01/2018 41     Triglycerides (mg/dL)   Date Value   09/01/2018 50     Lab Results   Component Value Date    A1C 5.9 12/06/2021     Lab Results   Component Value Date    TSH 0.434 09/01/2018       No follow-ups on file.

## 2022-10-24 RX ORDER — ATORVASTATIN CALCIUM 10 MG/1
10 TABLET, FILM COATED ORAL NIGHTLY
Qty: 90 TABLET | Refills: 1 | Status: SHIPPED | OUTPATIENT
Start: 2022-10-24

## 2022-10-24 NOTE — TELEPHONE ENCOUNTER
Future appt: Your appointments     Date & Time Appointment Department [facility])    Dec 10, 2022  9:30 AM CST Follow Up Visit with Al[facility] MD [facility])            25 [facility] [address] [phone]  [phone]        Last Appointment with provider:   Visit date not found  [facility] appointment at [facility]:  9/9/2022  Cholesterol, Total (mg/dL)   Date Value   09/01/2018 121     HDL Cholesterol (mg/dL)   Date Value   09/01/2018 70 (H)     LDL Cholesterol (mg/dL)   Date Value   09/01/2018 41     Triglycerides (mg/dL)   Date Value   09/01/2018 50     Lab Results   Component Value Date    A1C 5.9 12/06/2021     Lab Results   Component Value Date    TSH 0.434 09/01/2018     Last RF:  11/12/2021    No follow-ups on file.

## 2022-11-29 ENCOUNTER — IMMUNIZATION (OUTPATIENT)
Dept: FAMILY MEDICINE CLINIC | Facility: CLINIC | Age: 57
End: 2022-11-29
Payer: COMMERCIAL

## 2022-11-29 DIAGNOSIS — Z23 NEED FOR VACCINATION: Primary | ICD-10-CM

## 2022-11-29 PROCEDURE — 90471 IMMUNIZATION ADMIN: CPT | Performed by: FAMILY MEDICINE

## 2022-11-29 PROCEDURE — 90686 IIV4 VACC NO PRSV 0.5 ML IM: CPT | Performed by: FAMILY MEDICINE

## 2022-12-20 NOTE — TELEPHONE ENCOUNTER
Last refill 9/6/22 #30 w/2 refills    Future appt:     Last Appointment with provider:   9/9/2022 Return if symptoms worsen or fail to improve. [facility]:  9/9/2022  Cholesterol, Total (mg/dL)   Date Value   09/01/2018 121     HDL Cholesterol (mg/dL)   Date Value   09/01/2018 70 (H)     LDL Cholesterol (mg/dL)   Date Value   09/01/2018 41     Triglycerides (mg/dL)   Date Value   09/01/2018 50     Lab Results   Component Value Date    A1C 5.9 12/06/2021     Lab Results   Component Value Date    TSH 0.434 09/01/2018       No follow-ups on file.

## 2022-12-21 RX ORDER — DIAZEPAM 5 MG/1
TABLET ORAL
Qty: 30 TABLET | Refills: 0 | Status: SHIPPED | OUTPATIENT
Start: 2022-12-21

## 2023-01-13 NOTE — TELEPHONE ENCOUNTER
Future appt:     Your appointments     Date & Time Appointment Department [facility])    Dec 14, 2018  8:00 AM CST Follow up - Extended with Diego Loera, 25 [facility], Nick ([facility])        [facility] Provider E-Visit time total (minutes):8    Sarah was seen today for cough.    Diagnoses and all orders for this visit:    Viral URI with cough  -     Symptomatic COVID-19 Virus (Coronavirus) by PCR; Future  -     guaiFENesin-dextromethorphan (ROBITUSSIN DM) 100-10 MG/5ML syrup; Take 10 mLs by mouth every 4 hours as needed for cough  -     ibuprofen (ADVIL/MOTRIN) 600 MG tablet; Take 1 tablet (600 mg) by mouth every 6 hours as needed for moderate pain (4-6)    Mild intermittent asthma without complication  -     albuterol (PROAIR HFA/PROVENTIL HFA/VENTOLIN HFA) 108 (90 Base) MCG/ACT inhaler; Inhale 2 puffs into the lungs every 6 hours as needed for shortness of breath, wheezing or cough

## 2023-01-20 PROCEDURE — 3044F HG A1C LEVEL LT 7.0%: CPT | Performed by: FAMILY MEDICINE

## 2023-01-24 DIAGNOSIS — M54.41 CHRONIC BILATERAL LOW BACK PAIN WITH RIGHT-SIDED SCIATICA: Primary | ICD-10-CM

## 2023-01-24 DIAGNOSIS — F41.1 ANXIETY STATE: ICD-10-CM

## 2023-01-24 DIAGNOSIS — G89.29 CHRONIC BILATERAL LOW BACK PAIN WITH RIGHT-SIDED SCIATICA: Primary | ICD-10-CM

## 2023-01-24 NOTE — TELEPHONE ENCOUNTER
Future appt:    Last Appointment with provider:   9/9/2022(hospital f/u)-f/u if needed  Last appointment at [facility]:  9/9/2022  Last PX-6/11/22-f/u 6mo(12/11/22)    DIAZEPAM 5 MG Oral Tab    30tab  0refill        Filled:12/21/22 Summary: TAKE 1 TABLET(5 MG) BY MOUTH EVERY 8 HOURS AS NEEDED          Cholesterol, Total (mg/dL)   Date Value   09/01/2018 121     HDL Cholesterol (mg/dL)   Date Value   09/01/2018 70 (H)     LDL Cholesterol (mg/dL)   Date Value   09/01/2018 41     Triglycerides (mg/dL)   Date Value   09/01/2018 50     Lab Results   Component Value Date    A1C 5.9 12/06/2021     Lab Results   Component Value Date    TSH 0.434 09/01/2018       No follow-ups on file.

## 2023-01-25 RX ORDER — DIAZEPAM 5 MG/1
TABLET ORAL
Qty: 30 TABLET | Refills: 0 | Status: SHIPPED | OUTPATIENT
Start: 2023-01-25

## 2023-01-25 NOTE — TELEPHONE ENCOUNTER
Please inform patient that his prescription is sent to pharmacy. Also remind him that he is due for his follow-up appointment, recommend to schedule when possible.

## 2023-02-28 ENCOUNTER — OFFICE VISIT (OUTPATIENT)
Dept: FAMILY MEDICINE CLINIC | Facility: CLINIC | Age: 58
End: 2023-02-28
Payer: COMMERCIAL

## 2023-02-28 VITALS
HEART RATE: 78 BPM | BODY MASS INDEX: 33.59 KG/M2 | DIASTOLIC BLOOD PRESSURE: 70 MMHG | WEIGHT: 248 LBS | SYSTOLIC BLOOD PRESSURE: 139 MMHG | RESPIRATION RATE: 18 BRPM | TEMPERATURE: 98 F | OXYGEN SATURATION: 98 % | HEIGHT: 72 IN

## 2023-02-28 DIAGNOSIS — Z94.0 HISTORY OF KIDNEY TRANSPLANT: ICD-10-CM

## 2023-02-28 DIAGNOSIS — E78.2 MIXED HYPERLIPIDEMIA: ICD-10-CM

## 2023-02-28 DIAGNOSIS — E08.3553: ICD-10-CM

## 2023-02-28 DIAGNOSIS — I10 ESSENTIAL HYPERTENSION: Primary | ICD-10-CM

## 2023-02-28 DIAGNOSIS — Z94.83 HISTORY OF PANCREAS TRANSPLANT (HCC): ICD-10-CM

## 2023-02-28 DIAGNOSIS — G47.09 OTHER INSOMNIA: ICD-10-CM

## 2023-02-28 DIAGNOSIS — F41.1 ANXIETY STATE: ICD-10-CM

## 2023-02-28 LAB
CREATININE, URINE: 77 MG/DL
HEMOGLOBIN A1C: 6 %
PROTEIN/CREAT RATIO: 0.13 MG/G CREAT
PROTEIN: 9.8

## 2023-02-28 PROCEDURE — 99214 OFFICE O/P EST MOD 30 MIN: CPT | Performed by: FAMILY MEDICINE

## 2023-02-28 PROCEDURE — 3075F SYST BP GE 130 - 139MM HG: CPT | Performed by: FAMILY MEDICINE

## 2023-02-28 PROCEDURE — 3078F DIAST BP <80 MM HG: CPT | Performed by: FAMILY MEDICINE

## 2023-02-28 PROCEDURE — 3008F BODY MASS INDEX DOCD: CPT | Performed by: FAMILY MEDICINE

## 2023-02-28 NOTE — PATIENT INSTRUCTIONS
Continue current medications. Blood pressure stable today. Anxiety stable with medication. Labs reviewed. A1c is in prediabetes range at 6.0. Advice low carb in diet, AVOID FOOD WITH HIGH GLYCEMIC INDEX, have smaller portion meal, avoid bread, pasta and potatoes, no juice or soda, don't eat late at night, exercise,  and weight loss. Recommend to continue to quit smoking. Check lipid panel next lab work. Return to clinic if any concern.

## 2023-03-01 DIAGNOSIS — F41.1 ANXIETY STATE: ICD-10-CM

## 2023-03-01 DIAGNOSIS — M54.41 CHRONIC BILATERAL LOW BACK PAIN WITH RIGHT-SIDED SCIATICA: ICD-10-CM

## 2023-03-01 DIAGNOSIS — G89.29 CHRONIC BILATERAL LOW BACK PAIN WITH RIGHT-SIDED SCIATICA: ICD-10-CM

## 2023-03-01 RX ORDER — DIAZEPAM 5 MG/1
5 TABLET ORAL EVERY 8 HOURS PRN
Qty: 30 TABLET | Refills: 2 | Status: SHIPPED | OUTPATIENT
Start: 2023-03-01

## 2023-03-01 RX ORDER — CYCLOBENZAPRINE HCL 10 MG
TABLET ORAL
Qty: 30 TABLET | Refills: 1 | Status: SHIPPED | OUTPATIENT
Start: 2023-03-01

## 2023-03-01 NOTE — TELEPHONE ENCOUNTER
Last appt 2/28/23 advised Return in about 6 months (around 8/28/2023) for physical.      Future Appointments   Date Time Provider Yvonne Soni   8/29/2023  8:30 AM Helga Mariee MD [facility]

## 2023-03-10 RX ORDER — ZOLPIDEM TARTRATE 10 MG/1
TABLET ORAL
Qty: 90 TABLET | Refills: 1 | Status: SHIPPED | OUTPATIENT
Start: 2023-03-10

## 2023-03-10 NOTE — TELEPHONE ENCOUNTER
Return in about 6 months (around 8/28/2023) for physical.    Future appt: Your appointments     Date & Time Appointment Department [facility])    Aug 29, 2023  8:30 AM CDT Physical - Established with Clarence Ross MD [address] [facility], Nick ([place])            [address] [facility], [facility]  [place] [address] [phone]  [phone]        Last Appointment with provider:   2/28/2023  [facility] appointment [facility]:  2/28/2023  Cholesterol, Total (mg/dL)   Date Value   09/01/2018 121     HDL Cholesterol (mg/dL)   Date Value   09/01/2018 70 (H)     LDL Cholesterol (mg/dL)   Date Value   09/01/2018 41     Triglycerides (mg/dL)   Date Value   09/01/2018 50     Lab Results   Component Value Date    A1C 6.0 (H) 01/20/2023     Lab Results   Component Value Date    TSH 0.434 09/01/2018       No follow-ups on file.

## 2023-04-03 PROCEDURE — 3044F HG A1C LEVEL LT 7.0%: CPT | Performed by: NURSE PRACTITIONER

## 2023-04-24 RX ORDER — ATORVASTATIN CALCIUM 10 MG/1
TABLET, FILM COATED ORAL
Qty: 90 TABLET | Refills: 1 | Status: SHIPPED | OUTPATIENT
Start: 2023-04-24

## 2023-04-24 NOTE — TELEPHONE ENCOUNTER
Future appt: Your appointments     Date & Time Appointment Department [facility])    Aug 29, 2023  8:30 AM CDT Physical - Established with Clarence Ross MD [address] [facility], Nick ([place])            [address] [facility], [facility] [place]  [place] [address] [phone]  [phone]        Last Appointment with provider:   2/28/2023  [facility]:  2/28/2023  Cholesterol, Total (mg/dL)   Date Value   09/01/2018 121     HDL Cholesterol (mg/dL)   Date Value   09/01/2018 70 (H)     LDL Cholesterol (mg/dL)   Date Value   09/01/2018 41     Triglycerides (mg/dL)   Date Value   09/01/2018 50     Lab Results   Component Value Date    A1C 6.0 (H) 01/20/2023     Lab Results   Component Value Date    TSH 0.434 09/01/2018     Last RF:  10/24/2022    No follow-ups on file.

## 2023-05-01 DIAGNOSIS — G89.29 CHRONIC BILATERAL LOW BACK PAIN WITH RIGHT-SIDED SCIATICA: ICD-10-CM

## 2023-05-01 DIAGNOSIS — M54.41 CHRONIC BILATERAL LOW BACK PAIN WITH RIGHT-SIDED SCIATICA: ICD-10-CM

## 2023-05-01 DIAGNOSIS — F41.1 ANXIETY STATE: ICD-10-CM

## 2023-05-01 RX ORDER — DIAZEPAM 5 MG/1
5 TABLET ORAL EVERY 8 HOURS PRN
Qty: 30 TABLET | Refills: 2 | Status: SHIPPED | OUTPATIENT
Start: 2023-05-01

## 2023-05-01 NOTE — TELEPHONE ENCOUNTER
Last appt 2/28/23 advised Return in about 6 months (around 8/28/2023) for physical.    Pt needs diazepam, not the zolpidem.        Future Appointments   Date Time Provider Yvonne Soni   8/29/2023  8:30 AM Светлана Jung MD [facility]

## 2023-05-25 ENCOUNTER — OFFICE VISIT (OUTPATIENT)
Dept: FAMILY MEDICINE CLINIC | Facility: CLINIC | Age: 58
End: 2023-05-25
Payer: COMMERCIAL

## 2023-05-25 VITALS
RESPIRATION RATE: 18 BRPM | BODY MASS INDEX: 34.72 KG/M2 | WEIGHT: 256.38 LBS | HEIGHT: 72 IN | TEMPERATURE: 98 F | HEART RATE: 81 BPM | SYSTOLIC BLOOD PRESSURE: 140 MMHG | OXYGEN SATURATION: 97 % | DIASTOLIC BLOOD PRESSURE: 60 MMHG

## 2023-05-25 DIAGNOSIS — D18.01 CHERRY ANGIOMA: ICD-10-CM

## 2023-05-25 DIAGNOSIS — T30.4 CHEMICAL BURN: Primary | ICD-10-CM

## 2023-05-25 PROCEDURE — 3008F BODY MASS INDEX DOCD: CPT | Performed by: NURSE PRACTITIONER

## 2023-05-25 PROCEDURE — 3077F SYST BP >= 140 MM HG: CPT | Performed by: NURSE PRACTITIONER

## 2023-05-25 PROCEDURE — 3078F DIAST BP <80 MM HG: CPT | Performed by: NURSE PRACTITIONER

## 2023-05-25 PROCEDURE — 99213 OFFICE O/P EST LOW 20 MIN: CPT | Performed by: NURSE PRACTITIONER

## 2023-05-25 RX ORDER — ASPIRIN 81 MG TABLET,DELAYED RELEASE
1
COMMUNITY

## 2023-05-25 NOTE — PATIENT INSTRUCTIONS
Continue with hydrocortisone cream to the area twice daily. If develops any signs of infection switch to bacitracin. Monitor the areas. Return if any become infected. Continue to watch blood sugars. Schedule a sleep follow-up in the next couple weeks.     Otherwise follow-up as needed

## 2023-06-08 RX ORDER — ZOLPIDEM TARTRATE 10 MG/1
10 TABLET ORAL NIGHTLY PRN
Qty: 90 TABLET | Refills: 0 | Status: SHIPPED | OUTPATIENT
Start: 2023-06-08

## 2023-06-08 NOTE — TELEPHONE ENCOUNTER
Spoke to pt informed that the Zolpidem, informed that to change pharmacies we would need to cancel with [facility]. Pt verbalized understanding.      Last appt 2/28/23 advised Return in about 6 months (around 8/28/2023) for physical.          Future Appointments   Date Time Provider Yvonne Soni   6/22/2023  3:00 PM Tiki Paulino MD [facility] [place]   8/29/2023  8:30 AM Michael Carson MD [facility] [place]

## 2023-06-22 ENCOUNTER — OFFICE VISIT (OUTPATIENT)
Dept: FAMILY MEDICINE CLINIC | Facility: CLINIC | Age: 58
End: 2023-06-22
Payer: COMMERCIAL

## 2023-06-22 VITALS
TEMPERATURE: 98 F | RESPIRATION RATE: 18 BRPM | SYSTOLIC BLOOD PRESSURE: 132 MMHG | WEIGHT: 256.19 LBS | DIASTOLIC BLOOD PRESSURE: 78 MMHG | HEIGHT: 72 IN | BODY MASS INDEX: 34.7 KG/M2 | OXYGEN SATURATION: 97 % | HEART RATE: 71 BPM

## 2023-06-22 DIAGNOSIS — J44.9 CHRONIC OBSTRUCTIVE PULMONARY DISEASE, UNSPECIFIED COPD TYPE (HCC): ICD-10-CM

## 2023-06-22 DIAGNOSIS — G47.33 OSA (OBSTRUCTIVE SLEEP APNEA): Primary | ICD-10-CM

## 2023-06-22 PROCEDURE — 3075F SYST BP GE 130 - 139MM HG: CPT | Performed by: NURSE PRACTITIONER

## 2023-06-22 PROCEDURE — 3008F BODY MASS INDEX DOCD: CPT | Performed by: NURSE PRACTITIONER

## 2023-06-22 PROCEDURE — 99214 OFFICE O/P EST MOD 30 MIN: CPT | Performed by: NURSE PRACTITIONER

## 2023-06-22 PROCEDURE — 3078F DIAST BP <80 MM HG: CPT | Performed by: NURSE PRACTITIONER

## 2023-07-03 PROCEDURE — 3044F HG A1C LEVEL LT 7.0%: CPT | Performed by: FAMILY MEDICINE

## 2023-08-14 DIAGNOSIS — F41.1 ANXIETY STATE: ICD-10-CM

## 2023-08-14 DIAGNOSIS — G89.29 CHRONIC BILATERAL LOW BACK PAIN WITH RIGHT-SIDED SCIATICA: ICD-10-CM

## 2023-08-14 DIAGNOSIS — M54.41 CHRONIC BILATERAL LOW BACK PAIN WITH RIGHT-SIDED SCIATICA: ICD-10-CM

## 2023-08-14 RX ORDER — DIAZEPAM 5 MG/1
5 TABLET ORAL EVERY 8 HOURS PRN
Qty: 30 TABLET | Refills: 2 | Status: SHIPPED | OUTPATIENT
Start: 2023-08-14

## 2023-08-14 NOTE — TELEPHONE ENCOUNTER
Diazepam: 5/1/23    Return in about 6 months (around 8/28/2023) for physical.    Future Appointments   Date Time Provider Yvonne Squiresi   8/29/2023  8:30 AM Veronica Hua MD [facility] [place]   12/21/2023  4:00 PM Mel Lennon MD [facility] [place]

## 2023-09-19 NOTE — TELEPHONE ENCOUNTER
Pt due for [facility] 9/19    Future Appointments   Date Time Provider Yvonne Soni   12/21/2023  4:00 PM Di Frost MD [facility] [facility] [place]     LOV w/ nam 2/28/23 for HTN-f/u 6 mo for PX(8/28/23)    zolpidem 10 MG Oral Tab  90tab  0refill    Filled:6/8/23              Summary: Take 1 tablet (10 mg total) by mouth nightly as needed

## 2023-09-20 NOTE — TELEPHONE ENCOUNTER
Future Appointments   Date Time Provider Yvonne Nae   9/22/2023 11:30 AM Juliane Bertrand MD [facility]   12/21/2023  4:00 PM Irene Sandoval MD [facility] [place]

## 2023-09-21 RX ORDER — ZOLPIDEM TARTRATE 10 MG/1
10 TABLET ORAL NIGHTLY PRN
Qty: 90 TABLET | Refills: 0 | Status: SHIPPED | OUTPATIENT
Start: 2023-09-21

## 2023-09-22 ENCOUNTER — OFFICE VISIT (OUTPATIENT)
Dept: FAMILY MEDICINE CLINIC | Facility: CLINIC | Age: 58
End: 2023-09-22
Payer: COMMERCIAL

## 2023-09-22 VITALS
HEIGHT: 72 IN | OXYGEN SATURATION: 97 % | DIASTOLIC BLOOD PRESSURE: 62 MMHG | RESPIRATION RATE: 18 BRPM | BODY MASS INDEX: 33.72 KG/M2 | TEMPERATURE: 98 F | WEIGHT: 249 LBS | HEART RATE: 79 BPM | SYSTOLIC BLOOD PRESSURE: 136 MMHG

## 2023-09-22 DIAGNOSIS — Z00.00 PHYSICAL EXAM: Primary | ICD-10-CM

## 2023-09-22 DIAGNOSIS — E78.00 PURE HYPERCHOLESTEROLEMIA: ICD-10-CM

## 2023-09-22 DIAGNOSIS — I10 ESSENTIAL HYPERTENSION: ICD-10-CM

## 2023-09-22 DIAGNOSIS — J41.0 SIMPLE CHRONIC BRONCHITIS (HCC): ICD-10-CM

## 2023-09-22 DIAGNOSIS — E08.3553: ICD-10-CM

## 2023-09-22 DIAGNOSIS — F41.1 ANXIETY STATE: ICD-10-CM

## 2023-09-22 DIAGNOSIS — Z12.5 PROSTATE CANCER SCREENING: ICD-10-CM

## 2023-09-22 PROCEDURE — 99396 PREV VISIT EST AGE 40-64: CPT | Performed by: FAMILY MEDICINE

## 2023-09-22 PROCEDURE — 3008F BODY MASS INDEX DOCD: CPT | Performed by: FAMILY MEDICINE

## 2023-09-22 PROCEDURE — 3078F DIAST BP <80 MM HG: CPT | Performed by: FAMILY MEDICINE

## 2023-09-22 PROCEDURE — 3075F SYST BP GE 130 - 139MM HG: CPT | Performed by: FAMILY MEDICINE

## 2023-09-22 NOTE — PATIENT INSTRUCTIONS
Continue current medications. Blood pressure slightly elevated today. Advice low salt diet and exercise. Monitor your blood pressure. Return to clinic if systolic blood pressure more than 431 or diastolic more than 345. Recommend low-carb, low-cholesterol diet and exercise. Check labs. Recommend shingles vaccine. Recommend to quit smoking.

## 2023-09-27 DIAGNOSIS — G89.29 CHRONIC BILATERAL LOW BACK PAIN WITH RIGHT-SIDED SCIATICA: ICD-10-CM

## 2023-09-27 DIAGNOSIS — F41.1 ANXIETY STATE: ICD-10-CM

## 2023-09-27 DIAGNOSIS — M54.41 CHRONIC BILATERAL LOW BACK PAIN WITH RIGHT-SIDED SCIATICA: ICD-10-CM

## 2023-09-27 RX ORDER — DIAZEPAM 5 MG/1
5 TABLET ORAL EVERY 8 HOURS PRN
Qty: 30 TABLET | Refills: 2 | Status: SHIPPED | OUTPATIENT
Start: 2023-09-27

## 2023-09-27 NOTE — TELEPHONE ENCOUNTER
Last appt 9/22/23 advised Return in about 6 months (around 3/22/2024) for follow up.       Future Appointments   Date Time Provider Yvonne Soni   12/21/2023  4:00 PM Cortney Mesa MD [facility]

## (undated) NOTE — LETTER
Date: 6/15/2019    Patient Name: Laura Nieves          To Whom it may concern: This letter has been written at the patient's request. The above patient was seen at the [facility] for treatment of a medical condition.             Sincere

## (undated) NOTE — LETTER
Date: 11/8/2019    Patient Name: Collette Beasley          To Whom it may concern: This letter has been written at the patient's request. The above patient was seen at the [facility] for treatment of a medical condition.       The patient m

## (undated) NOTE — LETTER
11/03/20        Ariana Bowser  [address]  [place] [address]      Dear Katy Jose,    [facility] records indicate that you have outstanding lab work and or testing that was ordered for you and has not yet been completed:  Orders Placed This [address]

## (undated) NOTE — LETTER
Date: 3/28/2019    Patient Name: Collette Beasley          To Whom it may concern: This letter has been written at the patient's request. The above patient was seen at the [facility] for treatment of a medical condition.           Sincerely

## (undated) NOTE — MR AVS SNAPSHOT
[facility] [address] [place] [address],  [address]  [phone]               Thank you for choosing us for your health care visit with Patricia Stroud MD.  We are glad to serve you and happy to provide you with this summary of yo requirements for authorization, please wait 5-7 days and then contact your physician's office. At that time, you will be provided with any authorization numbers or be assured that none are required. You can then schedule your appointment.  Failure to obtain Take 1 tablet (5 mg total) by mouth nightly as needed for Muscle spasms.    What changed:    - how much to take  - how to take this  - when to take this  - reasons to take this   Commonly known as:  FLEXERIL           DULERA 200-5 MCG/ACT Aero   Generic jesús [email or web address]. If you've recently had a stay at the Hospital you can access your discharge instructions in [employer] by going to Visits < Admission Summaries.  If you've been to the Emergency Department or your doctor's office, you can view yo Visit [facility] online at  [email or web address]

## (undated) NOTE — LETTER
Date: 4/15/2019    Patient Name: Eugenio Green          To Whom it may concern: This letter has been written at the patient's request. The above patient was seen at the [facility] for treatment of a medical condition.     This patient sh